# Patient Record
Sex: FEMALE | Race: WHITE | Employment: FULL TIME | URBAN - METROPOLITAN AREA
[De-identification: names, ages, dates, MRNs, and addresses within clinical notes are randomized per-mention and may not be internally consistent; named-entity substitution may affect disease eponyms.]

---

## 2018-03-09 ENCOUNTER — HOSPITAL ENCOUNTER (OUTPATIENT)
Age: 48
Setting detail: OBSERVATION
Discharge: HOME | End: 2018-03-10
Attending: INTERNAL MEDICINE | Admitting: INTERNAL MEDICINE
Payer: COMMERCIAL

## 2018-03-09 PROBLEM — I25.10 CAD (CORONARY ARTERY DISEASE): Status: ACTIVE | Noted: 2018-03-09

## 2018-03-09 PROBLEM — I10 HTN (HYPERTENSION): Status: ACTIVE | Noted: 2018-03-09

## 2018-03-09 PROBLEM — R07.9 CHEST PAIN: Status: ACTIVE | Noted: 2018-03-09

## 2018-03-09 PROBLEM — J44.9 COPD (CHRONIC OBSTRUCTIVE PULMONARY DISEASE) (HCC): Status: ACTIVE | Noted: 2018-03-09

## 2018-03-09 PROBLEM — I25.110 CORONARY ARTERY DISEASE INVOLVING NATIVE CORONARY ARTERY OF NATIVE HEART WITH UNSTABLE ANGINA PECTORIS (HCC): Status: ACTIVE | Noted: 2018-03-09

## 2018-03-09 LAB
HCG SERPL QL: NEGATIVE
TROPONIN I SERPL-MCNC: <0.02 NG/ML (ref 0.02–0.05)

## 2018-03-09 PROCEDURE — 93005 ELECTROCARDIOGRAM TRACING: CPT | Performed by: NURSE PRACTITIONER

## 2018-03-09 PROCEDURE — 74011250637 HC RX REV CODE- 250/637: Performed by: INTERNAL MEDICINE

## 2018-03-09 PROCEDURE — 99218 HC RM OBSERVATION: CPT

## 2018-03-09 PROCEDURE — 96375 TX/PRO/DX INJ NEW DRUG ADDON: CPT

## 2018-03-09 PROCEDURE — 96361 HYDRATE IV INFUSION ADD-ON: CPT

## 2018-03-09 PROCEDURE — 74011250636 HC RX REV CODE- 250/636: Performed by: NURSE PRACTITIONER

## 2018-03-09 PROCEDURE — 96376 TX/PRO/DX INJ SAME DRUG ADON: CPT

## 2018-03-09 PROCEDURE — 96374 THER/PROPH/DIAG INJ IV PUSH: CPT

## 2018-03-09 PROCEDURE — 84484 ASSAY OF TROPONIN QUANT: CPT | Performed by: INTERNAL MEDICINE

## 2018-03-09 PROCEDURE — 36415 COLL VENOUS BLD VENIPUNCTURE: CPT | Performed by: INTERNAL MEDICINE

## 2018-03-09 PROCEDURE — 74011636637 HC RX REV CODE- 636/637: Performed by: NURSE PRACTITIONER

## 2018-03-09 PROCEDURE — 74011250637 HC RX REV CODE- 250/637: Performed by: NURSE PRACTITIONER

## 2018-03-09 PROCEDURE — 84703 CHORIONIC GONADOTROPIN ASSAY: CPT | Performed by: INTERNAL MEDICINE

## 2018-03-09 RX ORDER — PREDNISONE 20 MG/1
60 TABLET ORAL EVERY 6 HOURS
Status: COMPLETED | OUTPATIENT
Start: 2018-03-09 | End: 2018-03-10

## 2018-03-09 RX ORDER — ONDANSETRON 2 MG/ML
4 INJECTION INTRAMUSCULAR; INTRAVENOUS
Status: DISCONTINUED | OUTPATIENT
Start: 2018-03-09 | End: 2018-03-10 | Stop reason: HOSPADM

## 2018-03-09 RX ORDER — GUAIFENESIN 100 MG/5ML
81 LIQUID (ML) ORAL DAILY
Status: DISCONTINUED | OUTPATIENT
Start: 2018-03-10 | End: 2018-03-10 | Stop reason: HOSPADM

## 2018-03-09 RX ORDER — DIPHENHYDRAMINE HCL 25 MG
25 CAPSULE ORAL EVERY 6 HOURS
Status: DISCONTINUED | OUTPATIENT
Start: 2018-03-09 | End: 2018-03-10 | Stop reason: HOSPADM

## 2018-03-09 RX ORDER — SODIUM CHLORIDE 0.9 % (FLUSH) 0.9 %
5-10 SYRINGE (ML) INJECTION EVERY 8 HOURS
Status: DISCONTINUED | OUTPATIENT
Start: 2018-03-09 | End: 2018-03-10 | Stop reason: HOSPADM

## 2018-03-09 RX ORDER — SODIUM CHLORIDE 9 MG/ML
100 INJECTION, SOLUTION INTRAVENOUS CONTINUOUS
Status: DISCONTINUED | OUTPATIENT
Start: 2018-03-10 | End: 2018-03-10 | Stop reason: HOSPADM

## 2018-03-09 RX ORDER — IBUPROFEN 200 MG
1 TABLET ORAL EVERY 24 HOURS
Status: DISCONTINUED | OUTPATIENT
Start: 2018-03-09 | End: 2018-03-10 | Stop reason: HOSPADM

## 2018-03-09 RX ORDER — IBUPROFEN 200 MG
1 TABLET ORAL DAILY
Status: DISCONTINUED | OUTPATIENT
Start: 2018-03-10 | End: 2018-03-09

## 2018-03-09 RX ORDER — MORPHINE SULFATE 2 MG/ML
2 INJECTION, SOLUTION INTRAMUSCULAR; INTRAVENOUS
Status: DISCONTINUED | OUTPATIENT
Start: 2018-03-09 | End: 2018-03-10 | Stop reason: HOSPADM

## 2018-03-09 RX ORDER — LORAZEPAM 0.5 MG/1
0.5 TABLET ORAL
Status: DISCONTINUED | OUTPATIENT
Start: 2018-03-09 | End: 2018-03-10 | Stop reason: HOSPADM

## 2018-03-09 RX ORDER — NITROGLYCERIN 0.4 MG/1
0.4 TABLET SUBLINGUAL
Status: DISCONTINUED | OUTPATIENT
Start: 2018-03-09 | End: 2018-03-10 | Stop reason: HOSPADM

## 2018-03-09 RX ORDER — SODIUM CHLORIDE 0.9 % (FLUSH) 0.9 %
5-10 SYRINGE (ML) INJECTION AS NEEDED
Status: DISCONTINUED | OUTPATIENT
Start: 2018-03-09 | End: 2018-03-10 | Stop reason: HOSPADM

## 2018-03-09 RX ORDER — FAMOTIDINE 20 MG/1
20 TABLET, FILM COATED ORAL 2 TIMES DAILY
Status: DISCONTINUED | OUTPATIENT
Start: 2018-03-09 | End: 2018-03-10 | Stop reason: HOSPADM

## 2018-03-09 RX ADMIN — Medication 5 ML: at 23:30

## 2018-03-09 RX ADMIN — PREDNISONE 60 MG: 20 TABLET ORAL at 23:23

## 2018-03-09 RX ADMIN — NITROGLYCERIN 0.4 MG: 0.4 TABLET SUBLINGUAL at 22:08

## 2018-03-09 RX ADMIN — DIPHENHYDRAMINE HYDROCHLORIDE 25 MG: 25 CAPSULE ORAL at 23:23

## 2018-03-09 RX ADMIN — NITROGLYCERIN 0.4 MG: 0.4 TABLET SUBLINGUAL at 19:48

## 2018-03-09 RX ADMIN — ONDANSETRON 4 MG: 2 INJECTION INTRAMUSCULAR; INTRAVENOUS at 19:58

## 2018-03-09 RX ADMIN — SODIUM CHLORIDE 500 ML: 900 INJECTION, SOLUTION INTRAVENOUS at 23:30

## 2018-03-09 RX ADMIN — NITROGLYCERIN 1 INCH: 20 OINTMENT TOPICAL at 18:37

## 2018-03-09 RX ADMIN — PREDNISONE 60 MG: 20 TABLET ORAL at 18:41

## 2018-03-09 RX ADMIN — SODIUM CHLORIDE 100 ML/HR: 900 INJECTION, SOLUTION INTRAVENOUS at 22:18

## 2018-03-09 RX ADMIN — NITROGLYCERIN 1 INCH: 20 OINTMENT TOPICAL at 23:23

## 2018-03-09 RX ADMIN — FAMOTIDINE 20 MG: 20 TABLET, FILM COATED ORAL at 18:41

## 2018-03-09 RX ADMIN — DIPHENHYDRAMINE HYDROCHLORIDE 25 MG: 25 CAPSULE ORAL at 18:41

## 2018-03-09 RX ADMIN — MORPHINE SULFATE 2 MG: 2 INJECTION, SOLUTION INTRAMUSCULAR; INTRAVENOUS at 18:44

## 2018-03-09 RX ADMIN — MORPHINE SULFATE 2 MG: 2 INJECTION, SOLUTION INTRAMUSCULAR; INTRAVENOUS at 23:00

## 2018-03-09 RX ADMIN — LORAZEPAM 0.5 MG: 0.5 TABLET ORAL at 19:57

## 2018-03-09 NOTE — IP AVS SNAPSHOT
[address] [place] [id number] 
[phone] Patient: Bernice Vasques MRN: [id number] :1970 A check florinda indicates which time of day the medication should be taken. My Medications START taking these medications Instructions Each Dose to Equal  
 Morning Noon Evening Bedtime  
 famotidine 20 mg tablet Commonly known as:  PEPCID Your last dose was:  3/10/2018 Take 1 Tab by mouth two (2) times a day. 20 mg Where to Get Your Medications Information on where to get these meds will be given to you by the nurse or doctor. ! Ask your nurse or doctor about these medications  
  famotidine 20 mg tablet · Reached out via phone to RICK Neville from [facility]. 

## 2018-03-09 NOTE — H&P
3/9/2018 5:40 PM    Admit Date: 3/9/2018    Admit Diagnosis: Chest Pain      Subjective:    Patient with recent treatment for bronchitis. Presented to outlying facility with 12 hours of chest pain arm pain and neck pain. Onset last night no precipitating or alleviating conditions. No prior cad history. 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 Worked up and thought to be new onset unstable angina. Transferred for cath    Objective: There were no vitals taken for this visit. ROS:  General ROS: negative for - chills  Hematological and Lymphatic ROS: negative for - blood clots or jaundice  Respiratory ROS: positive for - cough and shortness of breath  Cardiovascular ROS: positive for - chest pain and dyspnea on exertion  Gastrointestinal ROS: no abdominal pain, change in bowel habits, or black or bloody stools  Neurological ROS: no TIA or stroke symptoms    Physical Exam:    Physical Examination: General appearance - alert, well appearing, and in no distress  Mental status - alert, oriented to person, place, and time  Eyes - pupils equal and reactive, extraocular eye movements intact  Neck/lymph - supple, no significant adenopathy  Chest/CV - clear to auscultation, no wheezes, rales or rhonchi, symmetric air entry  Heart - normal rate, regular rhythm, normal S1, S2, no murmurs, rubs, clicks or gallops  Abdomen/GI - soft, nontender, nondistended, no masses or organomegaly  Musculoskeletal - no joint tenderness, deformity or swelling  Extremities - peripheral pulses normal, no pedal edema, no clubbing or cyanosis  Skin - normal coloration and turgor, no rashes, no suspicious skin lesions noted    No current facility-administered medications for this encounter.         Data Review:   @LABRCNT(Na,K,BUN,CREA,WBC,HGB,HCT,PLT,INR,TRP,TCHOL*,Triglyceride*,LDL*,LDLCPOC HDL*,HDL])@    TELEMETRY: nsr    Assessment/Plan:     Active Problems:    HTN (hypertension) (3/9/2018) The current medical regimen is effective;  continue present plan and medications. Monitor bp      Coronary artery disease involving native coronary artery of native heart with unstable angina pectoris ([facility] (3/9/2018)presumed cad. With new onset cp aT REST NEEDS CATH IN AM. Risk and benefits discussed. Risk of bleeding, infection, vascular injury, kidney failure or CVA reviewed. Risk of emergent surgery or death discussed. COPD (chronic obstructive pulmonary disease) ([facility] (3/9/2018) bronchitis. Seems stable needs to stop smoking.           Joe Stuart MD

## 2018-03-09 NOTE — PROGRESS NOTES
Verbal bedside report given to Herlinda oncoming RN. Patient's situation, background, assessment and recommendations provided. Opportunity for questions provided. Oncoming RN assumed care of patient.

## 2018-03-09 NOTE — IP AVS SNAPSHOT
[facility] 
 
 
 [address] [facility] [address] 
[phone] Patient: Merrick Gerardo MRN: [id number] :1970 About your hospitalization You were admitted on:  2018 You last received care in the:  [facility] 3 CLINICAL OBSERVATION You were discharged on:  March 10, 2018 Why you were hospitalized Your primary diagnosis was:  Non-Cardiac Chest Pain Your diagnoses also included:  Htn (Hypertension), Copd (Chronic Obstructive Pulmonary Disease) (Hcc), Tobacco Abuse Follow-up Information Follow up With Details Comments Contact Info Joselyn Krishnamurthy MD In 2 weeks [facility] follow up, Please call office for appointment on  [place] [address] [address] [facility] [address] 
[phone] Your Scheduled Appointments 2018  8:20 AM EDT  
(Arrive by 7:50 AM) New Patient with [facility]) [address] [facility] 40  
[phone] Discharge Orders None A check florinda indicates which time of day the medication should be taken. My Medications START taking these medications Instructions Each Dose to Equal  
 Morning Noon Evening Bedtime  
 famotidine 20 mg tablet Commonly known as:  PEPCID Your last dose was:  3/10/2018 Take 1 Tab by mouth two (2) times a day. 20 mg Where to Get Your Medications Information on where to get these meds will be given to you by the nurse or doctor. ! Ask your nurse or doctor about these medications  
  famotidine 20 mg tablet Discharge Instructions DISCHARGE SUMMARY from Nurse PATIENT INSTRUCTIONS: 
 
 
F-face looks uneven A-arms unable to move or move unevenly S-speech slurred or non-existent T-time-call 911 as soon as signs and symptoms begin-DO NOT go Back to bed or wait to see if you get better-TIME IS BRAIN. Warning Signs of HEART ATTACK Call 911 if you have these symptoms: 
? Chest discomfort. Most heart attacks involve discomfort in the center of the chest that lasts more than a few minutes, or that goes away and comes back. It can feel like uncomfortable pressure, squeezing, fullness, or pain. ? Discomfort in other areas of the upper body. Symptoms can include pain or discomfort in one or both arms, the back, neck, jaw, or stomach. ? Shortness of breath with or without chest discomfort. ? Other signs may include breaking out in a cold sweat, nausea, or lightheadedness. Don't wait more than five minutes to call [address]! Fast action can save your life. Calling 911 is almost always the fastest way to get lifesaving treatment. Emergency Medical Services staff can begin treatment when they arrive  up to an hour sooner than if someone gets to the hospital by car. The discharge information has been reviewed with the patient. The patient verbalized understanding. Discharge medications reviewed with the patient and appropriate educational materials and side effects teaching were provided. ___________________________________________________________________________________________________________________________________ Cardiac Catheterization/Angiography Discharge Instructions *Check the puncture site frequently for swelling or bleeding. If you see any bleeding, lie down and apply pressure over the area with a clean town or washcloth.  Notify your doctor for any redness, swelling, drainage or oozing from the puncture site. Notify your doctor for any fever or chills. *If the leg or arm with the puncture becomes cold, numb or painful, call Dr Rudy Mandel at  [phone] *Activity should be limited for the next 48 hours. Climb stairs as little as possible and avoid any stooping, bending or strenuous activity for 48 hours. No heavy lifting (anything over 10 pounds) for three days. *Do not drive for 48 hours. *You may resume your usual diet. Drink more fluids than usual. 
 
*Have a responsible person drive you home and stay with you for at least 24 hours after your heart catheterization/angiography. *You may remove the bandage from your Right and Arm in 24 hours. You may shower in 24 hours. No tub baths, hot tubs or swimming for one week. Do not place any lotions, creams, powders, ointments over the puncture site for one week. You may place a clean band-aid over the puncture site each day for 5 days. Change this daily. [employer] Announcement We are excited to announce that we are making your provider's discharge notes available to you in [employer]. You will see these notes when they are completed and signed by the physician that discharged you from your recent hospital stay. If you have any questions or concerns about any information you see in [employer], please call the Health Information Department where you were seen or reach out to your Primary Care Provider for more information about your plan of care. Introducing [facility]! [facility] introduces [employer] patient portal. Now you can access parts of your medical record, email your doctor's office, and request medication refills online. 1. In your internet browser, go to [email or web address] 2. Click on the First Time User? Click Here link in the Sign In box. You will see the New Member Sign Up page. 3. Enter your [employer] Access Code exactly as it appears below.  You will not need to use this code after youve completed the sign-up process. If you do not sign up before the expiration date, you must request a new code. · [employer] Access Code: [email or web address] Expires: 6/7/2018  5:33 PM 
 
4. Enter the last four digits of your Social Security Number (xxxx) and Date of Birth (mm/dd/yyyy) as indicated and click Submit. You will be taken to the next sign-up page. 5. Create a [employer] ID. This will be your [employer] login ID and cannot be changed, so think of one that is secure and easy to remember. 6. Create a [employer] password. You can change your password at any time. 7. Enter your Password Reset Question and Answer. This can be used at a later time if you forget your password. 8. Enter your e-mail address. You will receive e-mail notification when new information is available in [address] 9. Click Sign Up. You can now view and download portions of your medical record. 10. Click the Download Summary menu link to download a portable copy of your medical information. If you have questions, please visit the Frequently Asked Questions section of the [employer] website. Remember, [employer] is NOT to be used for urgent needs. For medical emergencies, dial 911. Now available from your iPhone and Android! Providers Seen During Your Hospitalization Provider Specialty Primary office phone Pari Duncan MD Cardiology [phone] Your Primary Care Physician (PCP) Primary Care Physician Office Phone Office Fax NOT ON FILE ** None ** ** None ** You are allergic to the following Allergen Reactions Iodinated Contrast- Oral And Iv Dye Hives Keflex (Cephalexin) Hives Recent Documentation Height Weight BMI  
  
  
 1.676 m 73.8 kg 26.28 kg/m2 Emergency Contacts Name Discharge Info Relation Home Work Mobile Unknown,Unknown  Other Relative [6] [phone] Patient Belongings The following personal items are in your possession at time of discharge: 
  Dental Appliances: None  Visual Aid: Glasses, With patient      Home Medications: None   Jewelry: None  Clothing: At bedside    Other Valuables: Eyeglasses, Cell Phone Please provide this summary of care documentation to your next provider. Signatures-by signing, you are acknowledging that this After Visit Summary has been reviewed with you and you have received a copy. Patient Signature:  ____________________________________________________________ Date:  ____________________________________________________________  
  
Buffy Deiters Provider Signature:  ____________________________________________________________ Date:  ____________________________________________________________

## 2018-03-09 NOTE — PROGRESS NOTES
Patient received to room 333 as direct admit. Patient oriented to room, call light and plan of care. Admission assessment completed. Admission skin assessment completed with second RN and reveals the following: Patient's sacrum and heels are intact and no skin breakdown. Patient's general skin is red but intact. Patient states she is sunburned. Scattered abrasions and scarred tissue on patient's face cheeks.

## 2018-03-10 VITALS
RESPIRATION RATE: 18 BRPM | HEART RATE: 52 BPM | BODY MASS INDEX: 26.16 KG/M2 | OXYGEN SATURATION: 95 % | SYSTOLIC BLOOD PRESSURE: 107 MMHG | TEMPERATURE: 97.9 F | HEIGHT: 66 IN | DIASTOLIC BLOOD PRESSURE: 58 MMHG | WEIGHT: 162.8 LBS

## 2018-03-10 PROBLEM — R07.89 NON-CARDIAC CHEST PAIN: Status: ACTIVE | Noted: 2018-03-10

## 2018-03-10 PROBLEM — Z72.0 TOBACCO ABUSE: Status: ACTIVE | Noted: 2018-03-10

## 2018-03-10 LAB
ANION GAP SERPL CALC-SCNC: 8 MMOL/L (ref 7–16)
ATRIAL RATE: 62 BPM
BUN SERPL-MCNC: 16 MG/DL (ref 6–23)
CALCIUM SERPL-MCNC: 8.6 MG/DL (ref 8.3–10.4)
CALCULATED P AXIS, ECG09: 61 DEGREES
CALCULATED R AXIS, ECG10: 29 DEGREES
CALCULATED T AXIS, ECG11: 65 DEGREES
CHLORIDE SERPL-SCNC: 110 MMOL/L (ref 98–107)
CHOLEST SERPL-MCNC: 143 MG/DL
CO2 SERPL-SCNC: 24 MMOL/L (ref 21–32)
CREAT SERPL-MCNC: 0.78 MG/DL (ref 0.6–1)
DIAGNOSIS, 93000: NORMAL
GLUCOSE SERPL-MCNC: 157 MG/DL (ref 65–100)
HDLC SERPL-MCNC: 56 MG/DL (ref 40–60)
HDLC SERPL: 2.6 {RATIO}
LDLC SERPL CALC-MCNC: 76.6 MG/DL
LIPID PROFILE,FLP: NORMAL
P-R INTERVAL, ECG05: 160 MS
POTASSIUM SERPL-SCNC: 4.3 MMOL/L (ref 3.5–5.1)
Q-T INTERVAL, ECG07: 440 MS
QRS DURATION, ECG06: 90 MS
QTC CALCULATION (BEZET), ECG08: 446 MS
SODIUM SERPL-SCNC: 142 MMOL/L (ref 136–145)
TRIGL SERPL-MCNC: 52 MG/DL (ref 35–150)
TROPONIN I SERPL-MCNC: <0.02 NG/ML (ref 0.02–0.05)
VENTRICULAR RATE, ECG03: 62 BPM
VLDLC SERPL CALC-MCNC: 10.4 MG/DL (ref 6–23)

## 2018-03-10 PROCEDURE — 74011250636 HC RX REV CODE- 250/636

## 2018-03-10 PROCEDURE — 93458 L HRT ARTERY/VENTRICLE ANGIO: CPT

## 2018-03-10 PROCEDURE — 84484 ASSAY OF TROPONIN QUANT: CPT | Performed by: INTERNAL MEDICINE

## 2018-03-10 PROCEDURE — 74011636320 HC RX REV CODE- 636/320: Performed by: INTERNAL MEDICINE

## 2018-03-10 PROCEDURE — 74011000250 HC RX REV CODE- 250: Performed by: INTERNAL MEDICINE

## 2018-03-10 PROCEDURE — 96376 TX/PRO/DX INJ SAME DRUG ADON: CPT

## 2018-03-10 PROCEDURE — C1769 GUIDE WIRE: HCPCS

## 2018-03-10 PROCEDURE — 80061 LIPID PANEL: CPT | Performed by: INTERNAL MEDICINE

## 2018-03-10 PROCEDURE — 74011250636 HC RX REV CODE- 250/636: Performed by: INTERNAL MEDICINE

## 2018-03-10 PROCEDURE — 74011250637 HC RX REV CODE- 250/637: Performed by: NURSE PRACTITIONER

## 2018-03-10 PROCEDURE — 96361 HYDRATE IV INFUSION ADD-ON: CPT

## 2018-03-10 PROCEDURE — 99218 HC RM OBSERVATION: CPT

## 2018-03-10 PROCEDURE — 74011250637 HC RX REV CODE- 250/637: Performed by: PHYSICIAN ASSISTANT

## 2018-03-10 PROCEDURE — 77030019569 HC BND COMPR RAD TERU -B

## 2018-03-10 PROCEDURE — C1894 INTRO/SHEATH, NON-LASER: HCPCS

## 2018-03-10 PROCEDURE — 77030015766

## 2018-03-10 PROCEDURE — 74011636637 HC RX REV CODE- 636/637: Performed by: NURSE PRACTITIONER

## 2018-03-10 PROCEDURE — 74011250636 HC RX REV CODE- 250/636: Performed by: NURSE PRACTITIONER

## 2018-03-10 PROCEDURE — 80048 BASIC METABOLIC PNL TOTAL CA: CPT | Performed by: INTERNAL MEDICINE

## 2018-03-10 PROCEDURE — 36415 COLL VENOUS BLD VENIPUNCTURE: CPT | Performed by: INTERNAL MEDICINE

## 2018-03-10 PROCEDURE — 99152 MOD SED SAME PHYS/QHP 5/>YRS: CPT

## 2018-03-10 RX ORDER — FENTANYL CITRATE 50 UG/ML
25-75 INJECTION, SOLUTION INTRAMUSCULAR; INTRAVENOUS
Status: DISCONTINUED | OUTPATIENT
Start: 2018-03-10 | End: 2018-03-10 | Stop reason: HOSPADM

## 2018-03-10 RX ORDER — LIDOCAINE HYDROCHLORIDE 20 MG/ML
2-10 INJECTION, SOLUTION INFILTRATION; PERINEURAL
Status: DISCONTINUED | OUTPATIENT
Start: 2018-03-10 | End: 2018-03-10 | Stop reason: HOSPADM

## 2018-03-10 RX ORDER — HEPARIN SODIUM 200 [USP'U]/100ML
3 INJECTION, SOLUTION INTRAVENOUS CONTINUOUS
Status: DISCONTINUED | OUTPATIENT
Start: 2018-03-10 | End: 2018-03-10 | Stop reason: HOSPADM

## 2018-03-10 RX ORDER — MIDAZOLAM HYDROCHLORIDE 1 MG/ML
.5-2 INJECTION, SOLUTION INTRAMUSCULAR; INTRAVENOUS
Status: DISCONTINUED | OUTPATIENT
Start: 2018-03-10 | End: 2018-03-10 | Stop reason: HOSPADM

## 2018-03-10 RX ORDER — FAMOTIDINE 20 MG/1
20 TABLET, FILM COATED ORAL 2 TIMES DAILY
Qty: 60 TAB | Refills: 1 | Status: SHIPPED | OUTPATIENT
Start: 2018-03-10

## 2018-03-10 RX ADMIN — IOPAMIDOL 70 ML: 755 INJECTION, SOLUTION INTRAVENOUS at 08:46

## 2018-03-10 RX ADMIN — DIPHENHYDRAMINE HYDROCHLORIDE 25 MG: 25 CAPSULE ORAL at 06:24

## 2018-03-10 RX ADMIN — Medication 30 ML: at 12:01

## 2018-03-10 RX ADMIN — PREDNISONE 60 MG: 20 TABLET ORAL at 06:24

## 2018-03-10 RX ADMIN — FAMOTIDINE 20 MG: 20 TABLET, FILM COATED ORAL at 07:50

## 2018-03-10 RX ADMIN — Medication 5 ML: at 06:25

## 2018-03-10 RX ADMIN — FENTANYL CITRATE 50 MCG: 50 INJECTION, SOLUTION INTRAMUSCULAR; INTRAVENOUS at 08:39

## 2018-03-10 RX ADMIN — MIDAZOLAM HYDROCHLORIDE 2 MG: 1 INJECTION, SOLUTION INTRAMUSCULAR; INTRAVENOUS at 08:39

## 2018-03-10 RX ADMIN — LIDOCAINE HYDROCHLORIDE 40 MG: 20 INJECTION, SOLUTION INFILTRATION; PERINEURAL at 08:39

## 2018-03-10 RX ADMIN — HEPARIN SODIUM 2 ML: 10000 INJECTION, SOLUTION INTRAVENOUS; SUBCUTANEOUS at 08:41

## 2018-03-10 RX ADMIN — NITROGLYCERIN 1 INCH: 20 OINTMENT TOPICAL at 06:24

## 2018-03-10 RX ADMIN — SODIUM CHLORIDE 100 ML/HR: 900 INJECTION, SOLUTION INTRAVENOUS at 03:57

## 2018-03-10 RX ADMIN — HEPARIN SODIUM 3 UNITS/HR: 200 INJECTION, SOLUTION INTRAVENOUS at 08:32

## 2018-03-10 RX ADMIN — ASPIRIN 81 MG 81 MG: 81 TABLET ORAL at 07:50

## 2018-03-10 RX ADMIN — MORPHINE SULFATE 2 MG: 2 INJECTION, SOLUTION INTRAMUSCULAR; INTRAVENOUS at 03:53

## 2018-03-10 RX ADMIN — DIPHENHYDRAMINE HYDROCHLORIDE 25 MG: 25 CAPSULE ORAL at 12:01

## 2018-03-10 NOTE — ROUTINE PROCESS
Cath Lab Transfer In    Transferred from tele  Transferred to : Cath Lab Procedure Room 1  Reason for Transfer: [address] [facility]    [facility]      Patient Assessment    Patient received into procedure room. No acute distress noted or verbalized. Procedural prep completed. Iv accesses assessed for patency. Review of pertinent labs and allergies completed. Noted presence of current History & Physical, updated within the previous 24 hours. Consent signed.

## 2018-03-10 NOTE — DISCHARGE INSTRUCTIONS
DISCHARGE SUMMARY from Nurse    PATIENT INSTRUCTIONS:    After general anesthesia or intravenous sedation, for 24 hours or while taking prescription Narcotics:  · Limit your activities  · Do not drive and operate hazardous machinery  · Do not make important personal or business decisions  · Do  not drink alcoholic beverages  · If you have not urinated within 8 hours after discharge, please contact your surgeon on call. Report the following to your surgeon:  · Excessive pain, swelling, redness or odor of or around the surgical area  · Temperature over 100.5  · Nausea and vomiting lasting longer than 4 hours or if unable to take medications  · Any signs of decreased circulation or nerve impairment to extremity: change in color, persistent  numbness, tingling, coldness or increase pain  · Any questions    What to do at Home:  Recommended activity: No lifting, Driving, or Strenuous exercise for 3 days    If you experience any of the following symptoms chest pain, shortness of breath, drainage at puncture site  please follow up with [facility]. *  Please give a list of your current medications to your Primary Care Provider. *  Please update this list whenever your medications are discontinued, doses are      changed, or new medications (including over-the-counter products) are added. *  Please carry medication information at all times in case of emergency situations. These are general instructions for a healthy lifestyle:    No smoking/ No tobacco products/ Avoid exposure to second hand smoke  Surgeon General's Warning:  Quitting smoking now greatly reduces serious risk to your health.     Obesity, smoking, and sedentary lifestyle greatly increases your risk for illness    A healthy diet, regular physical exercise & weight monitoring are important for maintaining a healthy lifestyle    You may be retaining fluid if you have a history of heart failure or if you experience any of the following symptoms:  Weight gain of 3 pounds or more overnight or 5 pounds in a week, increased swelling in our hands or feet or shortness of breath while lying flat in bed. Please call your doctor as soon as you notice any of these symptoms; do not wait until your next office visit. Recognize signs and symptoms of STROKE:    F-face looks uneven    A-arms unable to move or move unevenly    S-speech slurred or non-existent    T-time-call 911 as soon as signs and symptoms begin-DO NOT go       Back to bed or wait to see if you get better-TIME IS BRAIN. Warning Signs of HEART ATTACK     Call 911 if you have these symptoms:   Chest discomfort. Most heart attacks involve discomfort in the center of the chest that lasts more than a few minutes, or that goes away and comes back. It can feel like uncomfortable pressure, squeezing, fullness, or pain.  Discomfort in other areas of the upper body. Symptoms can include pain or discomfort in one or both arms, the back, neck, jaw, or stomach.  Shortness of breath with or without chest discomfort.  Other signs may include breaking out in a cold sweat, nausea, or lightheadedness. Don't wait more than five minutes to call 911 - MINUTES MATTER! Fast action can save your life. Calling 911 is almost always the fastest way to get lifesaving treatment. Emergency Medical Services staff can begin treatment when they arrive -- up to an hour sooner than if someone gets to the hospital by car. The discharge information has been reviewed with the patient. The patient verbalized understanding. Discharge medications reviewed with the patient and appropriate educational materials and side effects teaching were provided. ___________________________________________________________________________________________________________________________________    Cardiac Catheterization/Angiography Discharge Instructions    *Check the puncture site frequently for swelling or bleeding.  If you see any bleeding, lie down and apply pressure over the area with a clean town or washcloth. Notify your doctor for any redness, swelling, drainage or oozing from the puncture site. Notify your doctor for any fever or chills. *If the leg or arm with the puncture becomes cold, numb or painful, call Dr Jeffry Meléndez at  [phone]    *Activity should be limited for the next 48 hours. Climb stairs as little as possible and avoid any stooping, bending or strenuous activity for 48 hours. No heavy lifting (anything over 10 pounds) for three days. *Do not drive for 48 hours. *You may resume your usual diet. Drink more fluids than usual.    *Have a responsible person drive you home and stay with you for at least 24 hours after your heart catheterization/angiography. *You may remove the bandage from your Right and Arm in 24 hours. You may shower in 24 hours. No tub baths, hot tubs or swimming for one week. Do not place any lotions, creams, powders, ointments over the puncture site for one week. You may place a clean band-aid over the puncture site each day for 5 days. Change this daily.

## 2018-03-10 NOTE — PROGRESS NOTES
Applied TR-band to right wrist with 13 mL of air in band. Site without bleeding or hematoma. Capillary refill distal to the site was less than 2 seconds. Patient instructed to limit movement of affected wrist. Patient verbalized understanding.

## 2018-03-10 NOTE — PROGRESS NOTES
Reviewed discharged instructions with patient and new prescription for Famotidine. Patient verbalized understanding and had no further questions. Patient was also given work note to be excused until 3/13/2018. Patient's boyfriend states he is transporting the patient home.

## 2018-03-10 NOTE — PROGRESS NOTES
Bedside and Verbal shift change report received from [facility], [facility], [facility].  Report included the following information SBAR, Kardex, Intake/Output, MAR, Recent Results and Cardiac Rhythm SR.

## 2018-03-10 NOTE — PROGRESS NOTES
[address] March 10, 2018       RE: Mayank Beck      To Whom It May Concern,    This is to certify that Mayank Beck may may return to work on 3/13/18. Please feel free to contact my office if you have any questions or concerns. Thank you for your assistance in this matter.       Sincerely,  CV/Tele unit at [facility]

## 2018-03-10 NOTE — PROCEDURES
[address] Shiraz Graham  MR#: [id number]  : 1970  ACCOUNT #: [de-identified]   DATE OF SERVICE: 03/10/2018    PROCEDURE PERFORMED:  Left heart cath, selective coronary angiography, left ventriculogram.    INDICATION:  New onset class 4 angina. COMPLICATIONS:  None. ACCESS:  Right radial.      TIME:  8:30-8:53     MEDICATIONS:  2 mg Versed, 50 mcg of fentanyl delivered by Dede Dakin, RN. Aortic pressure 113/71, LVEDP 13. CONTRAST:  70 mL     FINDINGS:  Left ventriculogram done in the PAYAN projection shows EF 65%. No gradient on pullback. No wall motion abnormalities. Left main arises normally, bifurcates in LAD and circumflex. Left main is large and normal.    LAD courses the apex, supplies 3 diagonals. The LAD and diagonals are large and normal.    The circumflex artery in the AV groove is nondominant, supplies 2 OMs. The circumflex and OMs are normal.    Right coronary artery is a large, dominant artery coursing in the AV groove supplying a posterior descending and posterior lateral, right system is normal.    CONCLUSIONS:  Normal coronary arteriography with normal left ventricular systolic function. The patient has noncardiac chest pain.       MD [place] Flaherty /   D: 03/10/2018 08:54     T: 03/10/2018 11:01  JOB #: [id number]

## 2018-03-10 NOTE — PROGRESS NOTES
Patient reporting sustained 8/10 chest pain at 2315. BP 93/51. Patricia Velásquez NP notified. 500 mL normal saline bolus ordered. Bolus started at 999 mL/hr over 30 minutes. Patient given morphine. BP set to be checked every 30 minutes.

## 2018-03-10 NOTE — DISCHARGE SUMMARY
[place] [facility] Discharge Summary     Patient ID:  Selena Tomas  [id number]  78 y.o.  1970    Admit date: 3/9/2018    Discharge date:  3/10/2018    Admitting Physician: Fritz Smith MD     Discharge Physician: SLIM Rogel/Dr. Sachin Magana    Admission Diagnoses: Chest Pain  CAD (coronary artery disease)  Chest pain    Discharge Diagnoses:    Diagnosis    Tobacco abuse    Non-cardiac chest pain    HTN (hypertension)    COPD (chronic obstructive pulmonary disease) [facility])       Cardiology Procedures this admission:  Diagnostic left heart catheterization  Consults: None    Hospital Course: Patient presented to the emergency department of [facility] with complaints of chest pain x 12 hours with no precipitating factors and h/o tobacco use. Patient was evaluated and subsequently admitted for further cardiac evaluation and treatment. Cardiac enzymes were negative. LHC was planned for 3-10-18. Patient underwent cardiac catheterization by Dr. Sachin Magana which showed normal coronaries. Patient tolerated the procedure well and returned to the telemetry floor for recovery. The evening of 3/10/2018 patient was up feeling well without any complaints of chest pain or shortness of breath. Patient's right radial cath site was clean, dry and intact without hematoma or bruit. Patient's labs were WNL. Patient was seen and examined by Dr. Sachin Magana and determined stable and ready for discharge. The patient will follow up with [place] [facility] [facility] Dr. Sachin Magana as needed. DISPOSITION: The patient is being discharged home in stable condition on a low saturated fat, low cholesterol and low salt diet. The patient is instructed to advance activities as tolerated to the limit of fatigue or shortness of breath. The patient is instructed to avoid all heavy lifting for 5 days.  The patient is instructed to watch the cath site for bleeding/oozing; if seen, the patient is instructed to apply firm pressure with a clean cloth and call [address] Cardiology at [phone]. The patient is instructed to watch for signs of infection which include: increasing area of redness, fever/hot to touch or purulent drainage at the catheterization site. The patient is instructed not to soak in a bathtub for 7-10 days, but is cleared to shower. The patient is instructed to call the office or return to the ER for immediate evaluation for any shortness of breath or chest pain not relieved by NTG. Discharge Exam:   Visit Vitals    /60    Pulse 61    Temp 97.6 °F (36.4 °C)    Resp 16    Ht 5' 6\" (1.676 m)    Wt 73.8 kg (162 lb 12.8 oz)    SpO2 94%    BMI 26.28 kg/m2     Patient has been seen by Dr. Serrano Reason: see his progress note for exam details.     Recent Results (from the past 24 hour(s))   HCG QL SERUM    Collection Time: 03/09/18  6:52 PM   Result Value Ref Range    HCG, Ql. NEGATIVE  NEG     TROPONIN I    Collection Time: 03/09/18  6:52 PM   Result Value Ref Range    Troponin-I, Qt. <0.02 (L) 0.02 - 0.05 NG/ML   EKG, 12 LEAD, SUBSEQUENT    Collection Time: 03/09/18  7:56 PM   Result Value Ref Range    Ventricular Rate 62 BPM    Atrial Rate 62 BPM    P-R Interval 160 ms    QRS Duration 90 ms    Q-T Interval 440 ms    QTC Calculation (Bezet) 446 ms    Calculated P Axis 61 degrees    Calculated R Axis 29 degrees    Calculated T Axis 65 degrees    Diagnosis       Normal sinus rhythm  Normal ECG  No previous ECGs available     METABOLIC PANEL, BASIC    Collection Time: 03/10/18  1:53 AM   Result Value Ref Range    Sodium 142 136 - 145 mmol/L    Potassium 4.3 3.5 - 5.1 mmol/L    Chloride 110 (H) 98 - 107 mmol/L    CO2 24 21 - 32 mmol/L    Anion gap 8 7 - 16 mmol/L    Glucose 157 (H) 65 - 100 mg/dL    BUN 16 6 - 23 MG/DL    Creatinine 0.78 0.6 - 1.0 MG/DL    GFR est AA >60 >60 ml/min/1.73m2    GFR est non-AA >60 >60 ml/min/1.73m2    Calcium 8.6 8.3 - 10.4 MG/DL   LIPID PANEL    Collection Time: 03/10/18  1:53 AM   Result Value Ref Range    LIPID PROFILE          Cholesterol, total 143 <200 MG/DL    Triglyceride 52 35 - 150 MG/DL    HDL Cholesterol 56 40 - 60 MG/DL    LDL, calculated 76.6 <100 MG/DL    VLDL, calculated 10.4 6.0 - 23.0 MG/DL    CHOL/HDL Ratio 2.6     TROPONIN I    Collection Time: 03/10/18  1:53 AM   Result Value Ref Range    Troponin-I, Qt. <0.02 (L) 0.02 - 0.05 NG/ML         Patient Instructions:   Current Discharge Medication List      START taking these medications    Details   famotidine (PEPCID) 20 mg tablet Take 1 Tab by mouth two (2) times a day.   Qty: 60 Tab, Refills: 1               Signed:  Dionna Torres PA-C  3/10/2018  8:55 AM

## 2018-03-10 NOTE — PROGRESS NOTES
Bedside and Verbal shift change report given to self (oncoming nurse) by bakari shaikh RN (offgoing nurse). Report included the following information SBAR, Kardex and Recent Results.

## 2018-03-10 NOTE — ROUTINE PROCESS
TRANSFER - OUT REPORT:    [facility]  Dr. Hendrickson Meals  RRA access    Versed 2mg, fentanyl 50mcg  Diagnostic cath, medical management  TR band placed @ [phone] with 14ml air    Verbal report given to Prabhjot Ricketts RN(name) on Patrick Alicia  being transferred to [facility](unit) for routine progression of care       Report consisted of patients Situation, Background, Assessment and   Recommendations(SBAR). Information from the following report(s) Procedure Summary was reviewed with the receiving nurse. Lines:   Peripheral IV 03/09/18 Right Antecubital (Active)   Site Assessment Clean, dry, & intact 3/10/2018  4:10 AM   Phlebitis Assessment 0 3/10/2018  4:10 AM   Infiltration Assessment 0 3/10/2018  4:10 AM   Dressing Status Clean, dry, & intact 3/10/2018  4:10 AM   Dressing Type Transparent;Tape 3/10/2018  4:10 AM   Hub Color/Line Status Capped 3/10/2018  4:10 AM   Alcohol Cap Used No 3/9/2018  5:45 PM       Peripheral IV 03/09/18 Left Arm (Active)   Site Assessment Clean, dry, & intact 3/10/2018  4:10 AM   Phlebitis Assessment 0 3/10/2018  4:10 AM   Infiltration Assessment 0 3/10/2018  4:10 AM   Dressing Status Clean, dry, & intact 3/10/2018  4:10 AM   Dressing Type Transparent;Tape 3/10/2018  4:10 AM   Hub Color/Line Status Infusing 3/10/2018  4:10 AM        Opportunity for questions and clarification was provided.       Patient transported with:   [employer]

## 2018-03-10 NOTE — PROGRESS NOTES
Verbal bedside report given to Billy oncoming RN. Patient's situation, background, assessment and recommendations provided. Opportunity for questions provided. Oncoming RN assumed care of patient.

## 2018-03-10 NOTE — PROGRESS NOTES
3/10/2018 7:17 AM    Admit Date: 3/9/2018    Admit Diagnosis: Chest Pain;CAD (coronary artery disease); Chest pain      Subjective:    Patient with continued cp. Trop neg.  For cath today    Objective:      Visit Vitals    BP 99/50 (BP 1 Location: Right arm, BP Patient Position: At rest)    Pulse 65    Temp 97.6 °F (36.4 °C)    Resp 20    Ht 5' 6\" (1.676 m)    Wt 73.8 kg (162 lb 12.8 oz)    SpO2 96%    BMI 26.28 kg/m2       ROS:  General ROS: negative for - chills  Hematological and Lymphatic ROS: negative for - blood clots or jaundice  Respiratory ROS: no cough, shortness of breath, or wheezing  Cardiovascular ROS: no chest pain or dyspnea on exertion  Gastrointestinal ROS: no abdominal pain, change in bowel habits, or black or bloody stools  Neurological ROS: no TIA or stroke symptoms    Physical Exam:    Physical Examination: General appearance - alert, well appearing, and in no distress  Mental status - alert, oriented to person, place, and time  Eyes - pupils equal and reactive, extraocular eye movements intact  Neck/lymph - supple, no significant adenopathy  Chest/CV - clear to auscultation, no wheezes, rales or rhonchi, symmetric air entry  Heart - normal rate, regular rhythm, normal S1, S2, no murmurs, rubs, clicks or gallops  Abdomen/GI - soft, nontender, nondistended, no masses or organomegaly  Musculoskeletal - no joint tenderness, deformity or swelling  Extremities - peripheral pulses normal, no pedal edema, no clubbing or cyanosis  Skin - normal coloration and turgor, no rashes, no suspicious skin lesions noted    Current Facility-Administered Medications   Medication Dose Route Frequency    LORazepam (ATIVAN) tablet 0.5 mg  0.5 mg Oral Q4H PRN    sodium chloride (NS) flush 5-10 mL  5-10 mL IntraVENous Q8H    sodium chloride (NS) flush 5-10 mL  5-10 mL IntraVENous PRN    aspirin chewable tablet 81 mg  81 mg Oral DAILY    nitroglycerin (NITROBID) 2 % ointment 1 Inch  1 Inch Topical Q6H    nitroglycerin (NITROSTAT) tablet 0.4 mg  0.4 mg SubLINGual Q5MIN PRN    morphine injection 2 mg  2 mg IntraVENous Q4H PRN    0.9% sodium chloride infusion  100 mL/hr IntraVENous CONTINUOUS    famotidine (PEPCID) tablet 20 mg  20 mg Oral BID    diphenhydrAMINE (BENADRYL) capsule 25 mg  25 mg Oral Q6H    nicotine (NICODERM CQ) 21 mg/24 hr patch 1 Patch  1 Patch TransDERmal Q24H    ondansetron (ZOFRAN) injection 4 mg  4 mg IntraVENous Q6H PRN       Data Review:   @LABRCNT(Na,K,BUN,CREA,WBC,HGB,HCT,PLT,INR,TRP,TCHOL*,Triglyceride*,LDL*,LDLCPOC HDL*,HDL])@    TELEMETRY: nsr    Assessment/Plan:     Active Problems:    HTN (hypertension) (3/9/2018)The current medical regimen is effective;  continue present plan and medications.         Coronary artery disease involving native coronary artery of native heart with unstable angina pectoris ([facility] [address] (3/9/2018) cath today      COPD (chronic obstructive pulmonary disease) ([facility] [address] (3/9/2018)      CAD (coronary artery disease) (3/9/2018)      Chest pain (3/9/2018)          Smith Anders MD

## 2018-03-10 NOTE — PROGRESS NOTES
TRANSFER - IN REPORT:    Verbal report received from Jennifer Monge RN on Jeral Poag  being received from [address] [place] [address] for routine progression of care. Report consisted of patients Situation, Background, Assessment and   Recommendations(SBAR). Information from the following reports was reviewed: Kardex, Procedure Summary, MAR and Recent Results. Opportunity for questions and clarification was provided. Assessment completed upon patients arrival to unit and care assumed. Patient received to room 333 and assessment completed. Patient connected to telemetry monitor and eagle with BP cycling every 15 minutes. Patient oriented to room and plan of care reviewed. Patient voiced understanding of keeping wrist immobilized. right radial site benign, dressing dry and intact, no hematoma; R band in place. Per report, Pt has 13 ML air in band. Patient provided with clear liquids. Patient aware to use call light to communicate needs. Instructed patient to not use arm for any pushing or pulling. Upon arrival to the floor pt stated that she had some numbness just below the band. 2 ML air removed at the time patient arrived from cath lab.

## 2018-03-10 NOTE — PROGRESS NOTES
Patient reported 8/10 chest pain at shift change. IV morphine was given by Danyel Barrett at 7983. Diane Dao NP was called and an EKG was ordered. At 1948 1 SL nitroglycerin was given. BP was 100/62. Patient reported feeling nauseous with nitroglycerin and vomited a small amount. 4 mg Zofran ordered. Zofran and ativan were given at [address] At 2000, patient's family brought in a hamburger meal, salad, and milk shake. Patient ate meal with no problems or nausea. Patient reported pain 5/10 at 2100. Patient reported worsening chest pain at 2208 with a pain level of 8/10. BP was 90/48. 1 SL nitroglycerin was given. Patient did not have immediate nausea this time. At 2214 pain was reported at remaining 8/10 and 0.9% normal saline was started at 100 mL/hr. BP at this time was 84/48.  Will continue to monitor

## 2018-03-10 NOTE — PROGRESS NOTES
Call placed to [facility]. Pt has returned for clean cath.   States that she still is having some discomfort in her chest.  Nery Bermeo stated to hold pain meds at this time and give a GI coctail

## 2018-03-26 PROBLEM — I25.110 CORONARY ARTERY DISEASE INVOLVING NATIVE CORONARY ARTERY OF NATIVE HEART WITH UNSTABLE ANGINA PECTORIS (HCC): Status: RESOLVED | Noted: 2018-03-09 | Resolved: 2018-03-26

## 2018-03-26 PROBLEM — I10 HTN (HYPERTENSION): Chronic | Status: ACTIVE | Noted: 2018-03-09

## 2018-03-26 PROBLEM — I25.10 CAD (CORONARY ARTERY DISEASE): Status: RESOLVED | Noted: 2018-03-09 | Resolved: 2018-03-26

## 2018-07-18 ENCOUNTER — HOSPITAL ENCOUNTER (INPATIENT)
Age: 48
LOS: 4 days | Discharge: HOME | DRG: 190 | End: 2018-07-22
Attending: EMERGENCY MEDICINE | Admitting: INTERNAL MEDICINE
Payer: COMMERCIAL

## 2018-07-18 DIAGNOSIS — J44.1 COPD EXACERBATION (HCC): Primary | ICD-10-CM

## 2018-07-18 DIAGNOSIS — A41.9 SEPSIS, DUE TO UNSPECIFIED ORGANISM: ICD-10-CM

## 2018-07-18 DIAGNOSIS — Z72.0 TOBACCO ABUSE: ICD-10-CM

## 2018-07-18 PROBLEM — D72.829 LEUCOCYTOSIS: Status: ACTIVE | Noted: 2018-07-18

## 2018-07-18 PROBLEM — N63.20 LEFT BREAST LUMP: Status: ACTIVE | Noted: 2018-07-18

## 2018-07-18 PROBLEM — J21.9 ACUTE BRONCHIOLITIS WITH BRONCHOSPASM: Status: ACTIVE | Noted: 2018-07-18

## 2018-07-18 PROBLEM — J96.01 ACUTE RESPIRATORY FAILURE WITH HYPOXEMIA (HCC): Status: ACTIVE | Noted: 2018-07-18

## 2018-07-18 PROBLEM — E87.20 LACTIC ACIDOSIS: Status: ACTIVE | Noted: 2018-07-18

## 2018-07-18 LAB
APPEARANCE UR: CLEAR
ARTERIAL PATENCY WRIST A: POSITIVE
ATRIAL RATE: 45 BPM
BACTERIA URNS QL MICRO: 0 /HPF
BASE DEFICIT BLDA-SCNC: 5.5 MMOL/L (ref 0–2)
BDY SITE: ABNORMAL
BILIRUB UR QL: NEGATIVE
CALCULATED P AXIS, ECG09: 58 DEGREES
CALCULATED R AXIS, ECG10: 63 DEGREES
CALCULATED T AXIS, ECG11: 91 DEGREES
CASTS URNS QL MICRO: ABNORMAL /LPF
COHGB MFR BLD: 2 % (ref 0.5–1.5)
COLOR UR: YELLOW
DIAGNOSIS, 93000: NORMAL
DO-HGB BLD-MCNC: 7 % (ref 0–5)
EPI CELLS #/AREA URNS HPF: ABNORMAL /HPF
GLUCOSE UR STRIP.AUTO-MCNC: NEGATIVE MG/DL
HCO3 BLDA-SCNC: 19 MMOL/L (ref 22–26)
HGB BLDMV-MCNC: 13.7 GM/DL (ref 11.7–15)
HGB UR QL STRIP: ABNORMAL
KETONES UR QL STRIP.AUTO: NEGATIVE MG/DL
LACTATE BLD-SCNC: 3.4 MMOL/L (ref 0.5–1.9)
LACTATE SERPL-SCNC: 3.9 MMOL/L (ref 0.4–2)
LACTATE SERPL-SCNC: 3.9 MMOL/L (ref 0.4–2)
LEUKOCYTE ESTERASE UR QL STRIP.AUTO: NEGATIVE
METHGB MFR BLD: 0.4 % (ref 0–1.5)
NITRITE UR QL STRIP.AUTO: NEGATIVE
OXYHGB MFR BLDA: 91.1 % (ref 94–97)
P-R INTERVAL, ECG05: 130 MS
PCO2 BLDA: 33 MMHG (ref 35–45)
PH BLDA: 7.37 [PH] (ref 7.35–7.45)
PH UR STRIP: 5 [PH] (ref 5–9)
PO2 BLDA: 69 MMHG (ref 80–105)
PROT UR STRIP-MCNC: NEGATIVE MG/DL
Q-T INTERVAL, ECG07: 494 MS
QRS DURATION, ECG06: 92 MS
QTC CALCULATION (BEZET), ECG08: 427 MS
RBC #/AREA URNS HPF: ABNORMAL /HPF
SAO2 % BLD: 93 % (ref 92–98.5)
SP GR UR REFRACTOMETRY: 1.01 (ref 1–1.02)
UROBILINOGEN UR QL STRIP.AUTO: 0.2 EU/DL (ref 0.2–1)
VENTRICULAR RATE, ECG03: 45 BPM
WBC URNS QL MICRO: 0 /HPF

## 2018-07-18 PROCEDURE — 76937 US GUIDE VASCULAR ACCESS: CPT

## 2018-07-18 PROCEDURE — C1751 CATH, INF, PER/CENT/MIDLINE: HCPCS

## 2018-07-18 PROCEDURE — 74011000250 HC RX REV CODE- 250: Performed by: EMERGENCY MEDICINE

## 2018-07-18 PROCEDURE — 81001 URINALYSIS AUTO W/SCOPE: CPT | Performed by: INTERNAL MEDICINE

## 2018-07-18 PROCEDURE — 74011250636 HC RX REV CODE- 250/636: Performed by: EMERGENCY MEDICINE

## 2018-07-18 PROCEDURE — 74011250637 HC RX REV CODE- 250/637: Performed by: INTERNAL MEDICINE

## 2018-07-18 PROCEDURE — 83605 ASSAY OF LACTIC ACID: CPT

## 2018-07-18 PROCEDURE — 82803 BLOOD GASES ANY COMBINATION: CPT

## 2018-07-18 PROCEDURE — 96365 THER/PROPH/DIAG IV INF INIT: CPT | Performed by: EMERGENCY MEDICINE

## 2018-07-18 PROCEDURE — 74011000250 HC RX REV CODE- 250: Performed by: INTERNAL MEDICINE

## 2018-07-18 PROCEDURE — 36600 WITHDRAWAL OF ARTERIAL BLOOD: CPT

## 2018-07-18 PROCEDURE — 83605 ASSAY OF LACTIC ACID: CPT | Performed by: INTERNAL MEDICINE

## 2018-07-18 PROCEDURE — 94760 N-INVAS EAR/PLS OXIMETRY 1: CPT

## 2018-07-18 PROCEDURE — 99284 EMERGENCY DEPT VISIT MOD MDM: CPT | Performed by: EMERGENCY MEDICINE

## 2018-07-18 PROCEDURE — 93005 ELECTROCARDIOGRAM TRACING: CPT | Performed by: EMERGENCY MEDICINE

## 2018-07-18 PROCEDURE — 94640 AIRWAY INHALATION TREATMENT: CPT

## 2018-07-18 PROCEDURE — 74011250636 HC RX REV CODE- 250/636: Performed by: INTERNAL MEDICINE

## 2018-07-18 PROCEDURE — 65270000029 HC RM PRIVATE

## 2018-07-18 PROCEDURE — 87040 BLOOD CULTURE FOR BACTERIA: CPT | Performed by: EMERGENCY MEDICINE

## 2018-07-18 PROCEDURE — 36415 COLL VENOUS BLD VENIPUNCTURE: CPT | Performed by: INTERNAL MEDICINE

## 2018-07-18 RX ORDER — BUDESONIDE 0.5 MG/2ML
500 INHALANT ORAL
Status: DISCONTINUED | OUTPATIENT
Start: 2018-07-18 | End: 2018-07-22 | Stop reason: HOSPADM

## 2018-07-18 RX ORDER — SODIUM CHLORIDE 0.9 % (FLUSH) 0.9 %
10 SYRINGE (ML) INJECTION EVERY 8 HOURS
Status: DISCONTINUED | OUTPATIENT
Start: 2018-07-18 | End: 2018-07-22 | Stop reason: HOSPADM

## 2018-07-18 RX ORDER — IPRATROPIUM BROMIDE AND ALBUTEROL SULFATE 2.5; .5 MG/3ML; MG/3ML
3 SOLUTION RESPIRATORY (INHALATION)
Status: DISCONTINUED | OUTPATIENT
Start: 2018-07-18 | End: 2018-07-22 | Stop reason: HOSPADM

## 2018-07-18 RX ORDER — ACETAMINOPHEN 325 MG/1
650 TABLET ORAL
Status: DISCONTINUED | OUTPATIENT
Start: 2018-07-18 | End: 2018-07-22 | Stop reason: HOSPADM

## 2018-07-18 RX ORDER — SODIUM CHLORIDE 0.9 % (FLUSH) 0.9 %
5-10 SYRINGE (ML) INJECTION EVERY 8 HOURS
Status: DISCONTINUED | OUTPATIENT
Start: 2018-07-18 | End: 2018-07-22 | Stop reason: HOSPADM

## 2018-07-18 RX ORDER — IPRATROPIUM BROMIDE AND ALBUTEROL SULFATE 2.5; .5 MG/3ML; MG/3ML
3 SOLUTION RESPIRATORY (INHALATION)
Status: COMPLETED | OUTPATIENT
Start: 2018-07-18 | End: 2018-07-18

## 2018-07-18 RX ORDER — LORATADINE 10 MG/1
10 TABLET ORAL DAILY
Status: DISCONTINUED | OUTPATIENT
Start: 2018-07-19 | End: 2018-07-22 | Stop reason: HOSPADM

## 2018-07-18 RX ORDER — IBUPROFEN 200 MG
1 TABLET ORAL DAILY
Status: DISCONTINUED | OUTPATIENT
Start: 2018-07-19 | End: 2018-07-18

## 2018-07-18 RX ORDER — ENOXAPARIN SODIUM 100 MG/ML
40 INJECTION SUBCUTANEOUS EVERY 24 HOURS
Status: DISCONTINUED | OUTPATIENT
Start: 2018-07-18 | End: 2018-07-22 | Stop reason: HOSPADM

## 2018-07-18 RX ORDER — FAMOTIDINE 20 MG/1
20 TABLET, FILM COATED ORAL 2 TIMES DAILY
Status: DISCONTINUED | OUTPATIENT
Start: 2018-07-18 | End: 2018-07-22 | Stop reason: HOSPADM

## 2018-07-18 RX ORDER — SODIUM CHLORIDE 0.9 % (FLUSH) 0.9 %
10 SYRINGE (ML) INJECTION AS NEEDED
Status: DISCONTINUED | OUTPATIENT
Start: 2018-07-18 | End: 2018-07-22 | Stop reason: HOSPADM

## 2018-07-18 RX ORDER — IBUPROFEN 200 MG
1 TABLET ORAL DAILY
Status: DISCONTINUED | OUTPATIENT
Start: 2018-07-18 | End: 2018-07-22 | Stop reason: HOSPADM

## 2018-07-18 RX ORDER — SODIUM CHLORIDE 9 MG/ML
100 INJECTION, SOLUTION INTRAVENOUS CONTINUOUS
Status: DISCONTINUED | OUTPATIENT
Start: 2018-07-18 | End: 2018-07-21

## 2018-07-18 RX ORDER — HYDROCODONE BITARTRATE AND ACETAMINOPHEN 7.5; 325 MG/15ML; MG/15ML
10 SOLUTION ORAL
Status: DISCONTINUED | OUTPATIENT
Start: 2018-07-18 | End: 2018-07-19

## 2018-07-18 RX ORDER — SODIUM CHLORIDE 0.9 % (FLUSH) 0.9 %
5-10 SYRINGE (ML) INJECTION AS NEEDED
Status: DISCONTINUED | OUTPATIENT
Start: 2018-07-18 | End: 2018-07-22 | Stop reason: HOSPADM

## 2018-07-18 RX ORDER — ONDANSETRON 2 MG/ML
4 INJECTION INTRAMUSCULAR; INTRAVENOUS
Status: DISCONTINUED | OUTPATIENT
Start: 2018-07-18 | End: 2018-07-22 | Stop reason: HOSPADM

## 2018-07-18 RX ORDER — IPRATROPIUM BROMIDE AND ALBUTEROL SULFATE 2.5; .5 MG/3ML; MG/3ML
3 SOLUTION RESPIRATORY (INHALATION)
Status: DISCONTINUED | OUTPATIENT
Start: 2018-07-18 | End: 2018-07-19

## 2018-07-18 RX ORDER — NALOXONE HYDROCHLORIDE 0.4 MG/ML
0.4 INJECTION, SOLUTION INTRAMUSCULAR; INTRAVENOUS; SUBCUTANEOUS AS NEEDED
Status: DISCONTINUED | OUTPATIENT
Start: 2018-07-18 | End: 2018-07-22 | Stop reason: HOSPADM

## 2018-07-18 RX ORDER — BISACODYL 5 MG
5 TABLET, DELAYED RELEASE (ENTERIC COATED) ORAL DAILY PRN
Status: DISCONTINUED | OUTPATIENT
Start: 2018-07-18 | End: 2018-07-22 | Stop reason: HOSPADM

## 2018-07-18 RX ORDER — DIPHENHYDRAMINE HYDROCHLORIDE 50 MG/ML
12.5 INJECTION, SOLUTION INTRAMUSCULAR; INTRAVENOUS
Status: DISCONTINUED | OUTPATIENT
Start: 2018-07-18 | End: 2018-07-22 | Stop reason: HOSPADM

## 2018-07-18 RX ORDER — LEVOFLOXACIN 5 MG/ML
500 INJECTION, SOLUTION INTRAVENOUS EVERY 24 HOURS
Status: DISCONTINUED | OUTPATIENT
Start: 2018-07-18 | End: 2018-07-19

## 2018-07-18 RX ORDER — PANTOPRAZOLE SODIUM 40 MG/1
40 TABLET, DELAYED RELEASE ORAL
Status: DISCONTINUED | OUTPATIENT
Start: 2018-07-18 | End: 2018-07-22 | Stop reason: HOSPADM

## 2018-07-18 RX ADMIN — ENOXAPARIN SODIUM 40 MG: 40 INJECTION, SOLUTION INTRAVENOUS; SUBCUTANEOUS at 16:45

## 2018-07-18 RX ADMIN — IPRATROPIUM BROMIDE AND ALBUTEROL SULFATE 3 ML: .5; 3 SOLUTION RESPIRATORY (INHALATION) at 19:50

## 2018-07-18 RX ADMIN — FAMOTIDINE 20 MG: 20 TABLET ORAL at 17:44

## 2018-07-18 RX ADMIN — SODIUM CHLORIDE 1000 ML: 900 INJECTION, SOLUTION INTRAVENOUS at 14:36

## 2018-07-18 RX ADMIN — IPRATROPIUM BROMIDE AND ALBUTEROL SULFATE 3 ML: .5; 3 SOLUTION RESPIRATORY (INHALATION) at 13:33

## 2018-07-18 RX ADMIN — Medication 5 ML: at 16:45

## 2018-07-18 RX ADMIN — IPRATROPIUM BROMIDE AND ALBUTEROL SULFATE 3 ML: .5; 3 SOLUTION RESPIRATORY (INHALATION) at 23:25

## 2018-07-18 RX ADMIN — Medication 10 ML: at 22:00

## 2018-07-18 RX ADMIN — METHYLPREDNISOLONE SODIUM SUCCINATE 40 MG: 40 INJECTION, POWDER, FOR SOLUTION INTRAMUSCULAR; INTRAVENOUS at 21:15

## 2018-07-18 RX ADMIN — BUDESONIDE 500 MCG: 0.5 INHALANT RESPIRATORY (INHALATION) at 19:50

## 2018-07-18 RX ADMIN — SODIUM CHLORIDE 150 ML/HR: 900 INJECTION, SOLUTION INTRAVENOUS at 21:17

## 2018-07-18 RX ADMIN — LEVOFLOXACIN 500 MG: 5 INJECTION, SOLUTION INTRAVENOUS at 18:46

## 2018-07-18 RX ADMIN — Medication 10 ML: at 21:17

## 2018-07-18 RX ADMIN — AZITHROMYCIN MONOHYDRATE 500 MG: 500 INJECTION, POWDER, LYOPHILIZED, FOR SOLUTION INTRAVENOUS at 14:43

## 2018-07-18 RX ADMIN — SODIUM CHLORIDE 150 ML/HR: 900 INJECTION, SOLUTION INTRAVENOUS at 18:36

## 2018-07-18 RX ADMIN — METHYLPREDNISOLONE SODIUM SUCCINATE 40 MG: 40 INJECTION, POWDER, FOR SOLUTION INTRAMUSCULAR; INTRAVENOUS at 18:47

## 2018-07-18 RX ADMIN — SODIUM CHLORIDE 1000 ML: 900 INJECTION, SOLUTION INTRAVENOUS at 20:00

## 2018-07-18 NOTE — ED TRIAGE NOTES
Pt states having a productive cough since Sunday. Pt went to urgent care and had blood work and an chest x ray done. Pt states she was sent to the hospital because she has pneumonia and needs to be admitted.

## 2018-07-18 NOTE — ED NOTES
TRANSFER - OUT REPORT:    Verbal report given to Whitley(name) on Letty Leong  being transferred to 809(unit) for routine progression of care       Report consisted of patients Situation, Background, Assessment and   Recommendations(SBAR). Information from the following report(s) SBAR, ED Summary, [facility] and Recent Results was reviewed with the receiving nurse. Lines:   Peripheral IV 07/18/18 Right Hand (Active)   Site Assessment Clean, dry, & intact 7/18/2018  1:15 PM   Phlebitis Assessment 0 7/18/2018  1:15 PM   Infiltration Assessment 0 7/18/2018  1:15 PM   Dressing Status Clean, dry, & intact 7/18/2018  1:15 PM   Alcohol Cap Used No 7/18/2018  1:15 PM        Opportunity for questions and clarification was provided.       Patient transported with:   [employer]

## 2018-07-18 NOTE — PROGRESS NOTES
MIDLINE Placement Note PRE-PROCEDURE VERIFICATION 
PROCEDURE DETAIL Time out completed all person present in agreement with time out. A single lumen Midline was started for vascular access. The following documentation is in addition to the Midline properties in the lines/airways flowsheet : 
Lot #: [id number] Xylocaine used: no 
Mid-Arm Circumference: 34 (cm) Internal Catheter Length: 10 (cm) Internal Catheter Total Length: 10 (cm) Vein Selection for Midline:left basilic Line is okay to use: yes.  
 
Grady Parnell RN, VAT

## 2018-07-18 NOTE — IP AVS SNAPSHOT
Summary of Care Report The Summary of Care report has been created to help improve care coordination. Users with access to [employer] or [employer] (Web-based application) may access additional patient information including the Discharge Summary. If you are not currently a [employer] user and need more information, please call the number listed below in the [place] [address] section and ask to be connected with Medical Records. Facility Information Name Address Phone [address] [address] [address] [phone] Patient Information Patient Name Sex MAURICE Pickett ([id number]) Female 1970 Discharge Information Admitting Provider Service Area Unit Leonora Blue MD / [address] [facility] / [phone] Discharge Provider Discharge Date/Time Discharge Disposition Destination (none) 2018 (Pending) AHR (none) Patient Language Language ENGLISH [13] Hospital Problems as of 2018  Reviewed: 3/26/2018  8:59 AM by SLIM Newby Class Noted - Resolved Last Modified POA Active Problems Tobacco abuse  3/10/2018 - Present 2018 by Nancy Lyons MD Yes Entered by SLIM Fischer Acute exacerbation of chronic obstructive pulmonary disease (COPD) ([facility] [address]  2018 - Present 2018 by Nancy Lyons MD Yes Entered by aNncy Lyons MD  
  Lactic acidosis  2018 - Present 2018 by Nancy Lyons MD Yes Entered by Nancy Lyons MD  
  Leucocytosis  2018 - Present 2018 by Nancy Lyons MD Yes Entered by Nancy Lyons MD  
  Acute bronchiolitis with bronchospasm  2018 - Present 2018 by Nancy Lyons MD Yes   Entered by Nancy Lyons MD  
  * (Principal)Acute respiratory failure with hypoxemia ([address]  2018 - Present 7/18/2018 by Pooja Mancia MD Yes Entered by Pooja Mancia MD  
  Left breast lump  7/18/2018 - Present 7/18/2018 by Pooja Mancia MD Yes Entered by Pooja Mancia MD  
  Atypical pneumonia  7/19/2018 - Present 7/19/2018 by Nickie Rosen MD Unknown Entered by Nickie Rosen MD  
  [facility])  7/19/2018 - Present 7/19/2018 by Nickie Rosen MD Unknown Entered by Nickie Rosen MD  
  
Non-Hospital Problems as of 7/22/2018  Reviewed: 3/26/2018  8:59 AM by SLIM Hernandez Class Noted - Resolved Last Modified Active Problems HTN (hypertension) (Chronic)  3/9/2018 - Present 3/26/2018 by SLIM Hernandez Entered by Andrew Gee MD  
  COPD (chronic obstructive pulmonary disease) ([facility] [address]  3/9/2018 - Present 3/9/2018 by Andrew Gee MD  
  Entered by Andrew Gee MD  
  Chest pain  3/9/2018 - Present 3/10/2018 by SLIM Nieves Entered by Jefferson Gonzalez NP Non-cardiac chest pain  3/10/2018 - Present 3/10/2018 by SLIM Nieves Entered by SLIM Nieves You are allergic to the following Allergen Reactions Iodinated Contrast- Oral And Iv Dye Hives Keflex (Cephalexin) Hives Current Discharge Medication List  
  
START taking these medications Dose & Instructions Dispensing Information Comments ALPRAZolam 0.25 mg tablet Commonly known as:  Judy Lacey Dose:  0.25 mg Take 1 Tab by mouth two (2) times daily as needed for Anxiety. Max Daily Amount: 0.5 mg. Do not use this medication together with tramadol. Allow a 2 hr window Quantity:  30 Tab Refills:  0  
   
 benzonatate 100 mg capsule Commonly known as:  TESSALON Dose:  100 mg Take 1 Cap by mouth three (3) times daily as needed for Cough for up to 5 days. Quantity:  15 Cap Refills:  0  
   
 guaiFENesin  mg ER tablet Commonly known as:  Ranjeet & Ranjeet  Dose:  600 mg  
 Take 1 Tab by mouth every twelve (12) hours for 7 days. Quantity:  14 Tab Refills:  0  
   
 guaiFENesin-dextromethorphan  mg/5 mL Liqd Commonly known as:  TUSSI-ORGANIDIN DM Dose:  10 mL Take 10 mL by mouth every six (6) hours as needed. Quantity:  1 Bottle Refills:  0  
   
 inhalational spacing device Dose:  1 Each  
1 Each by Does Not Apply route as needed. Quantity:  1 Device Refills:  0  
   
 levoFLOXacin 750 mg tablet Commonly known as:  Heath Screven Dose:  750 mg Take 1 Tab by mouth every twenty-four (24) hours for 3 days. Quantity:  3 Tab Refills:  0  
   
 nicotine 21 mg/24 hr  
Commonly known as:  Erskin Neither Start taking on:  7/23/2018 Dose:  1 Patch 1 Patch by TransDERmal route daily for 30 days. Quantity:  30 Patch Refills:  0  
   
 pantoprazole 40 mg tablet Commonly known as:  PROTONIX Start taking on:  7/23/2018 Dose:  40 mg Take 1 Tab by mouth Daily (before breakfast) for 10 days. Quantity:  10 Tab Refills:  0  
   
 predniSONE 20 mg tablet Commonly known as:  Angel Gelineau Take 2 tablets every day before breakfast for 3 days, then Take 1 tablet every day BB for 3 days, then Take half a tablet every day BB for 3 days, then stop Quantity:  11 Tab Refills:  0  
   
 traMADol 50 mg tablet Commonly known as:  ULTRAM  
 Dose:  50 mg Take 1 Tab by mouth every six (6) hours as needed. Max Daily Amount: 200 mg. Do not use this medication together with xanax. Allow a 2 hour window Quantity:  20 Tab Refills:  0 CONTINUE these medications which have CHANGED Dose & Instructions Dispensing Information Comments  
 albuterol 90 mcg/actuation inhaler Commonly known as:  PROAIR HFA What changed:   
- how much to take - when to take this 
- reasons to take this Dose:  2 Puff Take 2 Puffs by inhalation every six (6) hours as needed for Wheezing. Quantity:  1 Inhaler Refills:  1 CONTINUE these medications which have NOT CHANGED Dose & Instructions Dispensing Information Comments  
 famotidine 20 mg tablet Commonly known as:  PEPCID Dose:  20 mg Take 1 Tab by mouth two (2) times a day. Quantity:  60 Tab Refills:  1  
   
 fluticasone 50 mcg/actuation nasal spray Commonly known as:  Karyna Wilson Dose:  2 [place] 2 Sprays by Both Nostrils route daily. Quantity:  1 Bottle Refills:  11  
   
 fluticasone-vilanterol 200-25 mcg/dose inhaler Commonly known as:  BREO ELLIPTA Dose:  1 Puff Take 1 Puff by inhalation daily. RINSE MOUTH WELL AFTER USE Quantity:  1 Inhaler Refills:  11  
   
 loratadine 10 mg tablet Commonly known as:  Orsette Pace Dose:  10 mg Take 1 Tab by mouth daily. Quantity:  30 Tab Refills:  11 Follow-up Information Follow up With Details Comments Contact Info Not On File Bsi  please call your primary care doctor on Monday and schedule a follow up appt for a week out  Not On File (58) Patient has a PCP but that physician is not listed in [facility]. Discharge Instructions DISCHARGE SUMMARY from Nurse PATIENT INSTRUCTIONS: 
 
After general anesthesia or intravenous sedation, for 24 hours or while taking prescription Narcotics: · Limit your activities · Do not drive and operate hazardous machinery · Do not make important personal or business decisions · Do  not drink alcoholic beverages · If you have not urinated within 8 hours after discharge, please contact your surgeon on call. Report the following to your surgeon: 
· Excessive pain, swelling, redness or odor of or around the surgical area · Temperature over 100.5 · Nausea and vomiting lasting longer than 4 hours or if unable to take medications · Any signs of decreased circulation or nerve impairment to extremity: change in color, persistent  numbness, tingling, coldness or increase pain · Any questions What to do at Home: Recommended activity: Activity as tolerated If you experience any of the following symptoms shortness of breath not relieved by rest, pain not relieved by medication, chest pain or pressure, temperature greater than 101, nausea, vomiting, diarrhea, or increase in weakness fatigue or swelling please follow up with MD. 
 
*  Please give a list of your current medications to your Primary Care Provider. *  Please update this list whenever your medications are discontinued, doses are 
    changed, or new medications (including over-the-counter products) are added. *  Please carry medication information at all times in case of emergency situations. These are general instructions for a healthy lifestyle: No smoking/ No tobacco products/ Avoid exposure to second hand smoke Surgeon General's Warning:  Quitting smoking now greatly reduces serious risk to your health. Obesity, smoking, and sedentary lifestyle greatly increases your risk for illness A healthy diet, regular physical exercise & weight monitoring are important for maintaining a healthy lifestyle You may be retaining fluid if you have a history of heart failure or if you experience any of the following symptoms:  Weight gain of 3 pounds or more overnight or 5 pounds in a week, increased swelling in our hands or feet or shortness of breath while lying flat in bed. Please call your doctor as soon as you notice any of these symptoms; do not wait until your next office visit. Recognize signs and symptoms of STROKE: 
 
F-face looks uneven A-arms unable to move or move unevenly S-speech slurred or non-existent T-time-call 911 as soon as signs and symptoms begin-DO NOT go Back to bed or wait to see if you get better-TIME IS BRAIN. Warning Signs of HEART ATTACK Call 911 if you have these symptoms: 
? Chest discomfort.  Most heart attacks involve discomfort in the center of the chest that lasts more than a few minutes, or that goes away and comes back. It can feel like uncomfortable pressure, squeezing, fullness, or pain. ? Discomfort in other areas of the upper body. Symptoms can include pain or discomfort in one or both arms, the back, neck, jaw, or stomach. ? Shortness of breath with or without chest discomfort. ? Other signs may include breaking out in a cold sweat, nausea, or lightheadedness. Don't wait more than five minutes to call [address]! Fast action can save your life. Calling 911 is almost always the fastest way to get lifesaving treatment. Emergency Medical Services staff can begin treatment when they arrive  up to an hour sooner than if someone gets to the hospital by car. The discharge information has been reviewed with the patient and spouse. The patient and spouse verbalized understanding. Discharge medications reviewed with the patient and spouse and appropriate educational materials and side effects teaching were provided. ___________________________________________________________________________________________________________________________________ Shortness of Breath: Care Instructions Your Care Instructions Shortness of breath has many causes. Sometimes conditions such as anxiety can lead to shortness of breath. Some people get mild shortness of breath when they exercise. Trouble breathing also can be a symptom of a serious problem, such as asthma, lung disease, emphysema, heart problems, and pneumonia. If your shortness of breath continues, you may need tests and treatment. Watch for any changes in your breathing and other symptoms. Follow-up care is a key part of your treatment and safety. Be sure to make and go to all appointments, and call your doctor if you are having problems. It's also a good idea to know your test results and keep a list of the medicines you take. How can you care for yourself at home? · Do not smoke or allow others to smoke around you. If you need help quitting, talk to your doctor about stop-smoking programs and medicines. These can increase your chances of quitting for good. · Get plenty of rest and sleep. · Take your medicines exactly as prescribed. Call your doctor if you think you are having a problem with your medicine. · Find healthy ways to deal with stress. ¨ Exercise daily. ¨ Get plenty of sleep. ¨ Eat regularly and well. When should you call for help? Call 911 anytime you think you may need emergency care. For example, call if: 
  · You have severe shortness of breath.  
  · You have symptoms of a heart attack. These may include: ¨ Chest pain or pressure, or a strange feeling in the chest. 
¨ Sweating. ¨ Shortness of breath. ¨ Nausea or vomiting. ¨ Pain, pressure, or a strange feeling in the back, neck, jaw, or upper belly or in one or both shoulders or arms. ¨ Lightheadedness or sudden weakness. ¨ A fast or irregular heartbeat. After you call 911, the  may tell you to chew 1 adult-strength or 2 to 4 low-dose aspirin. Wait for an ambulance. Do not try to drive yourself.  
 Call your doctor now or seek immediate medical care if: 
  · Your shortness of breath gets worse or you start to wheeze. Wheezing is a high-pitched sound when you breathe.  
  · You wake up at night out of breath or have to prop your head up on several pillows to breathe.  
  · You are short of breath after only light activity or while at rest.  
 Watch closely for changes in your health, and be sure to contact your doctor if: 
  · You do not get better over the next 1 to 2 days. Where can you learn more? Go to http://jayna-scooter.info/. Enter S780 in the search box to learn more about \"Shortness of Breath: Care Instructions. \" Current as of: December 6, 2017 Content Version: 11.7 © 5844-1839 [employer].  Care instructions adapted under license by [address] (which disclaims liability or warranty for this information). If you have questions about a medical condition or this instruction, always ask your healthcare professional. [place] [address] any warranty or liability for your use of this information. Chart Review Routing History No Routing History on File

## 2018-07-18 NOTE — H&P
HOSPITALIST H&P/CONSULT 
NAME:  Lonnie Flores Age:  46 y.o. 
:   1970 MRN:   [id number] PCP: Not On File [facility] Consulting MD: Treatment Team: Attending Provider: Fernanda Farrell MD; Utilization Review: Patricia Talavera RN 
HPI:  
50yo F with PMH od COPD (not on home O2), Tobacco abuse, seasonal allergies, Breast Lump (benign) was transferred from [facility] for COPD exacerbation and Bronchitis. Pt started having SOB and wheezing 3 days ago. SOB was described as gradual onset, persistent, worse with exertion, no relieving factors, associated with cough productive of brownish sputum and fever 103.8. She went to ER at [facility] and was sent home on doxycycline and Prednisone. Her condition worsened over the next two days and she went to [facility] where her CXR was clear and she had severe wheezing with SOB. She was given decadron 10mg, Nebs and Rocephin 1g IV and sent to [facility] ER. Pt continued to wheeze and cough and LA done here was 3.4, ABG showed Hypoxemia with PO2 of 69 and PCO2 was 33. Hospitalist asked to admit Pt. Pt Feels a bit better but still SOB and wheezing. Also reports urine Is foul smelling, Appeared dark and has pelvic discomfort. Denies Vomiting, diarrhea, dizziness. Has some chest tightness and related it to wheezing and cough. Fiance at bedside providing history. He reports pt has a recurrent breast lump in left breast. Pt says she had mammogram scheduled but couldn't go, and is planning to reschedule soon. Her PCP is aware about the breast lump. Continues to smoke a pack per day, has tried Chantix but that didn't work. Chart reviewed from [facility] included Labs and CXR report. Case was discussed with ER MD. 
10 point ROS done and is negative except as noted in HPI. Past Medical History:  
Diagnosis Date  Breast cancer ([address]  COPD (chronic obstructive pulmonary disease) (HCC)  Tobacco dependence due to cigarettes Past Surgical History:  
Procedure Laterality Date  HX APPENDECTOMY  HX BREAST LUMPECTOMY  2016 [address]  HX CHOLECYSTECTOMY Prior to Admission Medications Prescriptions Last Dose Informant Patient Reported? Taking? albuterol (PROAIR HFA) 90 mcg/actuation inhaler   Yes No  
Sig: Take  by inhalation. famotidine (PEPCID) 20 mg tablet   No No  
Sig: Take 1 Tab by mouth two (2) times a day. fluticasone (FLONASE) 50 mcg/actuation nasal spray   No No  
Si Sprays by Both Nostrils route daily. fluticasone-vilanterol (BREO ELLIPTA) 200-25 mcg/dose inhaler   No No  
Sig: Take 1 Puff by inhalation daily. RINSE MOUTH WELL AFTER USE  
loratadine (CLARITIN) 10 mg tablet   No No  
Sig: Take 1 Tab by mouth daily. varenicline (CHANTIX CONTINUING MONTH SHAAN) 1 mg tablet   No No  
Sig: Take 1 Tab by mouth two (2) times a day. varenicline (CHANTIX STARTING MONTH BOX) 0.5 mg (11)- 1 mg (42) DsPk   No No  
Sig: Days 1 to 3: 0.5mg once a day Days 4 to 7: 0.5mg twice a day Day 8 and after: 1mg twice a day. Indications: Smoking Cessation Facility-Administered Medications: None Home meds reconciled. Allergies Allergen Reactions  Iodinated Contrast- Oral And Iv Dye Hives  Keflex [Cephalexin] Hives Social History Substance Use Topics  Smoking status: Current Every Day Smoker Packs/day: 1.00 Years: 33.00 Types: Cigarettes  Smokeless tobacco: Never Used  Alcohol use Not on file Family History Problem Relation Age of Onset  Diabetes Mother  Heart Disease Father There is no immunization history on file for this patient. Objective:  
 
Visit Vitals  /73 (BP 1 Location: Left arm, BP Patient Position: At rest)  Pulse 79  Temp 97.8 °F (36.6 °C)  Resp 18  Ht 5' 7\" (1.702 m)  Wt 68 kg (150 lb)  SpO2 96%  BMI 23.49 kg/m2 Temp (24hrs), Av.8 °F (36.6 °C), Min:97.7 °F (36.5 °C), Max:97.8 °F (36.6 °C) 
 
Oxygen Therapy O2 Sat (%): 96 % (07/18/18 1626) Pulse via Oximetry: 70 beats per minute (07/18/18 1334) O2 Device: Room air (07/18/18 1334) Physical Exam: 
General:    Alert, cooperative, no distress Head:   NCAT. No obvious deformity Nose:  Nares normal. No drainage Lungs:   B/l wheezing Heart:   RRR. No m/r/g. Abdomen:   S/nt/nd. Bowel sounds normal.  
Extremities: No cyanosis. Skin:     No rashes or lesions. Not Jaundiced Neurologic: Moves all extremities. no gross focal deficits Data Review:  
Recent Results (from the past 24 hour(s)) EKG, 12 LEAD, INITIAL Collection Time: 07/18/18  1:16 PM  
Result Value Ref Range Ventricular Rate 45 BPM  
 Atrial Rate 45 BPM  
 P-R Interval 130 ms QRS Duration 92 ms Q-T Interval 494 ms QTC Calculation (Bezet) 427 ms Calculated P Axis 58 degrees Calculated R Axis 63 degrees Calculated T Axis 91 degrees Diagnosis Sinus bradycardia Nonspecific ST and T wave abnormality Abnormal ECG When compared with ECG of 09-MAR-2018 19:56, No significant change was found Confirmed by Bang Bee MD (), Dave Briggs ([id number]) on 7/18/2018 2:36:03 PM 
  
POC LACTIC ACID Collection Time: 07/18/18  1:31 PM  
Result Value Ref Range Lactic Acid (POC) 3.4 (H) 0.5 - 1.9 mmol/L  
BLOOD GAS, ARTERIAL Collection Time: 07/18/18  2:20 PM  
Result Value Ref Range pH 7.37 7.35 - 7.45    
 PCO2 33 (L) 35 - 45 mmHg PO2 69 (L) 80 - 105 mmHg BICARBONATE 19 (L) 22 - 26 mmol/L  
 BASE DEFICIT 5.5 (H) 0 - 2 mmol/L  
 TOTAL HEMOGLOBIN 13.7 11.7 - 15.0 GM/DL  
 O2 SAT 93 92 - 98.5 % Arterial O2 Hgb 91.1 (L) 94 - 97 % CARBOXYHEMOGLOBIN 2.0 (H) 0.5 - 1.5 % METHEMOGLOBIN 0.4 0.0 - 1.5 % DEOXYHEMOGLOBIN 7 (H) 0.0 - 5.0 % SITE RR   
 ALLENS TEST POSITIVE Imaging /Procedures /Studies: 
I personally reviewed all labs, imaging, and other studies this admission: CXR report reviewed by me. EKG reviewed by me.  Sinus Bradycardia, no ischemic changes. CXR Results  (Last 48 hours) None CT Results  (Last 48 hours) None Assessment and Plan: Active Hospital Problems Diagnosis Date Noted  Acute exacerbation of chronic obstructive pulmonary disease (COPD) ([facility] [address] 07/18/2018  Lactic acidosis 07/18/2018  Leucocytosis 07/18/2018  Acute bronchiolitis with bronchospasm 07/18/2018  Tobacco abuse 03/10/2018 PLAN 
 
· Admit to inpt medical bed · She likely has COPD exacerbation superimposed by Acute Bronchitis. Also has Lactic Acidosis and suspect Pneumonia due to fever and productive cough and may become more prominent after IVF. · Will start on IV Solumedrol, duo-nebs, pulmicort and Levaquin with Normal saline for now. Get repeat CXR in am and r/o Pneumonia. Repeat Lactate at 5:30pm. Add PPI. · Leukocytosis is likely 2/2 steroids for 3 days vs ? PNA. Follow CXR in am. Will not trend WBCs. · Consider pulm eval in am if no improvement. · Add Nicotine patch · Send UA to r/o UTI given her symptoms. · Resume appropriate home meds. · Left Breast Lump: Exam deferred, out pt mammogram and follow up with PCP. FEN:  Regular diet DVT ppx:  Lovenox Code status:  Full Code Estimated LOS:  2-3 days Risk assessment:  High risk fiven LActic acidosis, Acute resp failure. Plan of care discussed with: RN, patient and her Fiance Signed By: Sanjuan Curling, MD   
 July 18, 2018

## 2018-07-18 NOTE — PROGRESS NOTES
Dr. Lexis Howell returned call regarding patient's critical lactic acid of 3.9. MD notified that patient is getting Levaquin at this time and then will be receiving NS at 150 ml/hr. No new orders received. Will continue to monitor and will report to oncoming shift. Patient is in stable condition with no complaints at this time. Nicotine patch noted to right shoulder. Family at bedside. Call light within reach. No needs noted.

## 2018-07-18 NOTE — PROGRESS NOTES
Patient arrived to floor via stretcher from ED. Patient transferred to bed with no assistance. Patient oriented to room. Patient appears to be a well nourished female of stated age. No skin breakdown noted. Patient does have some cici-umbilicus scaring. Vital signs are stable. Patient respirations even/unlabored. Denies any pain or needs at this time. Temp: 97.8 °F (36.6 °C) (07/18/18 1626) Pulse (Heart Rate): 79 (07/18/18 1626) Resp Rate: 18 (07/18/18 1626) BP: 129/73 (07/18/18 1626) O2 Sat (%): 96 % (07/18/18 1626) Weight: 68 kg (150 lb) (07/18/18 1130)     Patient still has IV from previous facility. Nurse attempted to flush IV and IV is infiltrated. MD notified that all IV medications are on hold and awaiting IV access team to arrive and attempt to place IV site. Will continue to monitor.

## 2018-07-18 NOTE — ED PROVIDER NOTES
HPI Comments: 61-year-old female presents with complaint of shortness of breath and productive cough with yellow/brown sputum since . States she was seen  for this complaint and prescribed doxycycline, prednisone taper. Patient went to urgent care earlier today and had blood work as well as chest x-ray. Documentation sent with patient reads as negative chest x-ray. WBC 15.5 (patient has been on Steroids since ). Report subjective fever at home on yesterday. Denies chest pain, nausea, vomiting, lower extremity swelling or pain, hemoptysis, dizziness, weakness, abdominal pain. Patient states she continues to smoke daily. Patient is a 46 y.o. female presenting with shortness of breath. The history is provided by the patient. No  was used. Shortness of Breath   This is a new problem. The current episode started more than 2 days ago. The problem has not changed since onset. Associated symptoms include rhinorrhea, cough, sputum production and wheezing. Pertinent negatives include no fever, no headaches, no sore throat, no ear pain, no neck pain, no hemoptysis, no PND, no orthopnea, no chest pain, no vomiting, no abdominal pain, no rash, no leg pain and no leg swelling. She has tried beta-agonist inhalers for the symptoms. The treatment provided no relief. Past Medical History:   Diagnosis Date    Breast cancer [facility])        Past Surgical History:   Procedure Laterality Date    HX APPENDECTOMY      HX BREAST LUMPECTOMY  2016    HX  SECTION      HX CHOLECYSTECTOMY           Family History:   Problem Relation Age of Onset    Diabetes Mother     Heart Disease Father        Social History     Social History    Marital status: SINGLE     Spouse name: N/A    Number of children: N/A    Years of education: N/A     Occupational History    Not on file.      Social History Main Topics    Smoking status: Current Every Day Smoker     Packs/day: 1.00     Years: 33.00     Types: Cigarettes    Smokeless tobacco: Never Used    Alcohol use Not on file    Drug use: Not on file    Sexual activity: Not on file     Other Topics Concern    Not on file     Social History Narrative    Pt's daughter lives with her. 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 She worked in [employer] for 10 years. She has 2 dogs and 1 fish at home. She has a boyfriend. ALLERGIES: Iodinated contrast- oral and iv dye and Keflex [cephalexin]    Review of Systems   Constitutional: Negative for chills, fatigue and fever. HENT: Positive for congestion and rhinorrhea. Negative for ear pain and sore throat. Respiratory: Positive for cough, sputum production, shortness of breath and wheezing. Negative for hemoptysis. Cardiovascular: Negative for chest pain, palpitations, orthopnea, leg swelling and PND. Gastrointestinal: Negative for abdominal pain, diarrhea, nausea and vomiting. Genitourinary: Negative for dysuria, flank pain, pelvic pain, vaginal bleeding and vaginal discharge. Musculoskeletal: Negative for back pain, gait problem, joint swelling, neck pain and neck stiffness. Skin: Negative for pallor and rash. Neurological: Negative for dizziness, seizures, syncope, weakness and headaches. Psychiatric/Behavioral: Negative for agitation and confusion. Vitals:    07/18/18 1130   BP: 123/57   Pulse: 68   Resp: 18   Temp: 97.7 °F (36.5 °C)   SpO2: 99%   Weight: 68 kg (150 lb)   Height: 5' 7\" (1.702 m)            Physical Exam   Constitutional: She is oriented to person, place, and time. Patient well appearing in no acute distress. Nontoxic in appearance. HENT:   Head: Normocephalic. Mouth/Throat: Oropharynx is clear and moist.   MMM. Neck: Normal range of motion. No JVD present. No tracheal deviation present. Cardiovascular: Normal rate, regular rhythm, normal heart sounds and intact distal pulses.     Pulses 2+ and equal throughout. Pulmonary/Chest: Effort normal. She has wheezes. She has no rales. Diffuse wheezes noted to bilateral lung fields. Abdominal: Soft. There is no rebound and no guarding. Soft, NTND. No rebound or guarding. No CVAT. Musculoskeletal: Normal range of motion. She exhibits no edema. Neurological: She is alert and oriented to person, place, and time. No cranial nerve deficit. Coordination normal.   Skin: Skin is warm and dry. No rash. Psychiatric: She has a normal mood and affect. Her behavior is normal.   Nursing note and vitals reviewed. MDM  Number of Diagnoses or Management Options  COPD exacerbation [facility]): new and requires workup  Diagnosis management comments: Labs from Emergency MD:  Glucose 94, P1 16, creatinine 0.5, sodium 138, potassium 4.3, chloride 108, CO2 19, calcium 8.9, WBC 15.5, H&H 13.6/42.1, Plt 382. Blood work obtained in ED lactate 3.4. Patient is a 1 L IV fluid bolus, blood cultures obtained. Patient already given Rocephin IM at [facility]; in addition to SoluMedrol 125 mg. Will additionally give her Zithromax 500 mg IV. Patient given additional DuoNeb treatment. Lungs with diffuse wheezes noted. Hospitalist consulted for admission.          Amount and/or Complexity of Data Reviewed  Clinical lab tests: reviewed  Tests in the medicine section of CPT®: reviewed  Discuss the patient with other providers: yes  Independent visualization of images, tracings, or specimens: yes    Risk of Complications, Morbidity, and/or Mortality  Presenting problems: moderate  Diagnostic procedures: low  Management options: low    Patient Progress  Patient progress: stable        ED Course       EKG  Date/Time: 7/18/2018 1:24 PM  Performed by: Marlon Casey, [address] [place] [address]  Authorized by: Talisha Pérez     ECG reviewed by ED Physician in the absence of a cardiologist: yes    Rate:     ECG rate:  45    ECG rate assessment: bradycardic    Rhythm:     Rhythm: sinus bradycardia Ectopy:     Ectopy: none    QRS:     QRS axis:  Normal    QRS intervals:  Normal  Conduction:     Conduction: normal    ST segments:     ST segments:  Normal  T waves:     T waves: normal        Results Include:    Recent Results (from the past 24 hour(s))   EKG, 12 LEAD, INITIAL    Collection Time: 07/18/18  1:16 PM   Result Value Ref Range    Ventricular Rate 45 BPM    Atrial Rate 45 BPM    P-R Interval 130 ms    QRS Duration 92 ms    Q-T Interval 494 ms    QTC Calculation (Bezet) 427 ms    Calculated P Axis 58 degrees    Calculated R Axis 63 degrees    Calculated T Axis 91 degrees    Diagnosis       Sinus bradycardia  Nonspecific ST and T wave abnormality  Abnormal ECG  When compared with ECG of 09-MAR-2018 19:56,  No significant change was found     POC LACTIC ACID    Collection Time: 07/18/18  1:31 PM   Result Value Ref Range    Lactic Acid (POC) 3.4 (H) 0.5 - 1.9 mmol/L   BLOOD GAS, ARTERIAL    Collection Time: 07/18/18  2:20 PM   Result Value Ref Range    pH 7.37 7.35 - 7.45      PCO2 33 (L) 35 - 45 mmHg    PO2 69 (L) 80 - 105 mmHg    BICARBONATE 19 (L) 22 - 26 mmol/L    BASE DEFICIT 5.5 (H) 0 - 2 mmol/L    TOTAL HEMOGLOBIN 13.7 11.7 - 15.0 GM/DL    O2 SAT 93 92 - 98.5 %    Arterial O2 Hgb 91.1 (L) 94 - 97 %    CARBOXYHEMOGLOBIN 2.0 (H) 0.5 - 1.5 %    METHEMOGLOBIN 0.4 0.0 - 1.5 %    DEOXYHEMOGLOBIN 7 (H) 0.0 - 5.0 %    SITE RR     ALLENS TEST POSITIVE           Josemanuel Lemus MD; 7/18/2018 @1:08 PM Voice dictation software was used during the making of this note. This software is not perfect and grammatical and other typographical errors may be present.   This note has not been proofread for errors.  ===================================================================

## 2018-07-18 NOTE — IP AVS SNAPSHOT
[address] 
[phone] Patient: Phuong Damon MRN: [id number] :1970 About your hospitalization You were admitted on:  2018 You last received care in the:  [facility] Yomi Shultz You were discharged on:  2018 Why you were hospitalized Your primary diagnosis was:  Acute Respiratory Failure With Hypoxemia (Hcc) Your diagnoses also included:  Acute Exacerbation Of Chronic Obstructive Pulmonary Disease (Copd) (Hcc), Tobacco Abuse, Lactic Acidosis, Leucocytosis, Acute Bronchiolitis With Bronchospasm, Left Breast Lump, Atypical Pneumonia, Sepsis (Hcc) Follow-up Information Follow up With Details Comments Contact Info Not On File Bshsi  please call your primary care doctor on Monday and schedule a follow up appt for a week out  Not On File (58) Patient has a PCP but that physician is not listed in [facility]. Discharge Orders None A check florinda indicates which time of day the medication should be taken. My Medications START taking these medications Instructions Each Dose to Equal  
 Morning Noon Evening Bedtime ALPRAZolam 0.25 mg tablet Commonly known as:  Melnellie Raphael Your next dose is: Take on as needed schedule Take 1 Tab by mouth two (2) times daily as needed for Anxiety. Max Daily Amount: 0.5 mg. Do not use this medication together with tramadol. Allow a 2 hr window 0.25 mg  
    
   
   
   
  
 benzonatate 100 mg capsule Commonly known as:  TESSALON Your next dose is: Take on as needed schedule Take 1 Cap by mouth three (3) times daily as needed for Cough for up to 5 days. 100 mg  
    
   
   
   
  
 guaiFENesin  mg ER tablet Commonly known as:  Ranjeet & Ranjeet Take 1 Tab by mouth every twelve (12) hours for 7 days.   
 600 mg  
    
   
   
  
   
  
 guaiFENesin-dextromethorphan  mg/5 mL Liqd Commonly known as:  TUSSI-ORGANIDIN DM Your next dose is: Take on as needed schedule Take 10 mL by mouth every six (6) hours as needed. 10 mL  
    
   
   
   
  
 inhalational spacing device Your next dose is: Take on as needed schedule 1 Each by Does Not Apply route as needed. 1 Each  
    
   
   
   
  
 levoFLOXacin 750 mg tablet Commonly known as:  Raven Rakes Take 1 Tab by mouth every twenty-four (24) hours for 3 days. 750 mg  
    
   
   
  
   
  
 nicotine 21 mg/24 hr  
Commonly known as:  Barnie Sartorius Start taking on:  7/23/2018 1 Patch by TransDERmal route daily for 30 days. 1 Patch  
    
  
   
   
   
  
 pantoprazole 40 mg tablet Commonly known as:  PROTONIX Start taking on:  7/23/2018 Take 1 Tab by mouth Daily (before breakfast) for 10 days. 40 mg  
    
  
   
   
   
  
 predniSONE 20 mg tablet Commonly known as:  Cleveland Welch Your next dose is: Take as directed Take 2 tablets every day before breakfast for 3 days, then Take 1 tablet every day BB for 3 days, then Take half a tablet every day BB for 3 days, then stop  
     
   
   
   
  
 traMADol 50 mg tablet Commonly known as:  ULTRAM  
Your next dose is: Take on as needed schedule Take 1 Tab by mouth every six (6) hours as needed. Max Daily Amount: 200 mg. Do not use this medication together with xanax. Allow a 2 hour window 50 mg CHANGE how you take these medications Instructions Each Dose to Equal  
 Morning Noon Evening Bedtime  
 albuterol 90 mcg/actuation inhaler Commonly known as:  PROAIR HFA What changed:   
- how much to take - when to take this 
- reasons to take this Your next dose is: Take on as needed schedule Take 2 Puffs by inhalation every six (6) hours as needed for Wheezing. 2 Puff CONTINUE taking these medications Instructions Each Dose to Equal  
 Morning Noon Evening Bedtime  
 famotidine 20 mg tablet Commonly known as:  PEPCID Take 1 Tab by mouth two (2) times a day. 20 mg  
    
   
   
  
   
  
 fluticasone 50 mcg/actuation nasal spray Commonly known as:  Jovan Robert Your next dose is: Take as directed 2 Sprays by Both Nostrils route daily. 2 Spray  
    
   
   
   
  
 fluticasone-vilanterol 200-25 mcg/dose inhaler Commonly known as:  WOODROW ELLIPTA Your next dose is: Take as directed Take 1 Puff by inhalation daily. RINSE MOUTH WELL AFTER USE  
 1 Puff  
    
   
   
   
  
 loratadine 10 mg tablet Commonly known as:  Michaell Organ Take 1 Tab by mouth daily. 10 mg Where to Get Your Medications Information on where to get these meds will be given to you by the nurse or doctor. ! Ask your nurse or doctor about these medications  
  albuterol 90 mcg/actuation inhaler ALPRAZolam 0.25 mg tablet  
 benzonatate 100 mg capsule  
 guaiFENesin  mg ER tablet  
 guaiFENesin-dextromethorphan  mg/5 mL Liqd  
 inhalational spacing device  
 levoFLOXacin 750 mg tablet  
 nicotine 21 mg/24 hr  
 pantoprazole 40 mg tablet  
 predniSONE 20 mg tablet  
 traMADol 50 mg tablet Opioid Education Prescription Opioids: What You Need to Know: 
 
 
After general anesthesia or intravenous sedation, for 24 hours or while taking prescription Narcotics: · Limit your activities · Do not drive and operate hazardous machinery · Do not make important personal or business decisions · Do  not drink alcoholic beverages · If you have not urinated within 8 hours after discharge, please contact your surgeon on call. Report the following to your surgeon: 
· Excessive pain, swelling, redness or odor of or around the surgical area · Temperature over 100.5 · Nausea and vomiting lasting longer than 4 hours or if unable to take medications · Any signs of decreased circulation or nerve impairment to extremity: change in color, persistent  numbness, tingling, coldness or increase pain · Any questions What to do at Home: 
Recommended activity: Activity as tolerated If you experience any of the following symptoms shortness of breath not relieved by rest, pain not relieved by medication, chest pain or pressure, temperature greater than 101, nausea, vomiting, diarrhea, or increase in weakness fatigue or swelling please follow up with MD. 
 
*  Please give a list of your current medications to your Primary Care Provider. *  Please update this list whenever your medications are discontinued, doses are 
    changed, or new medications (including over-the-counter products) are added. *  Please carry medication information at all times in case of emergency situations. These are general instructions for a healthy lifestyle: No smoking/ No tobacco products/ Avoid exposure to second hand smoke Surgeon General's Warning:  Quitting smoking now greatly reduces serious risk to your health. Obesity, smoking, and sedentary lifestyle greatly increases your risk for illness A healthy diet, regular physical exercise & weight monitoring are important for maintaining a healthy lifestyle You may be retaining fluid if you have a history of heart failure or if you experience any of the following symptoms:  Weight gain of 3 pounds or more overnight or 5 pounds in a week, increased swelling in our hands or feet or shortness of breath while lying flat in bed.   Please call your doctor as soon as you notice any of these symptoms; do not wait until your next office visit. Recognize signs and symptoms of STROKE: 
 
F-face looks uneven A-arms unable to move or move unevenly S-speech slurred or non-existent T-time-call 911 as soon as signs and symptoms begin-DO NOT go Back to bed or wait to see if you get better-TIME IS BRAIN. Warning Signs of HEART ATTACK Call 911 if you have these symptoms: 
? Chest discomfort. Most heart attacks involve discomfort in the center of the chest that lasts more than a few minutes, or that goes away and comes back. It can feel like uncomfortable pressure, squeezing, fullness, or pain. ? Discomfort in other areas of the upper body. Symptoms can include pain or discomfort in one or both arms, the back, neck, jaw, or stomach. ? Shortness of breath with or without chest discomfort. ? Other signs may include breaking out in a cold sweat, nausea, or lightheadedness. Don't wait more than five minutes to call [address]! Fast action can save your life. Calling 911 is almost always the fastest way to get lifesaving treatment. Emergency Medical Services staff can begin treatment when they arrive  up to an hour sooner than if someone gets to the hospital by car. The discharge information has been reviewed with the patient and spouse. The patient and spouse verbalized understanding. Discharge medications reviewed with the patient and spouse and appropriate educational materials and side effects teaching were provided. ___________________________________________________________________________________________________________________________________ Shortness of Breath: Care Instructions Your Care Instructions Shortness of breath has many causes. Sometimes conditions such as anxiety can lead to shortness of breath.  Some people get mild shortness of breath when they exercise. Trouble breathing also can be a symptom of a serious problem, such as asthma, lung disease, emphysema, heart problems, and pneumonia. If your shortness of breath continues, you may need tests and treatment. Watch for any changes in your breathing and other symptoms. Follow-up care is a key part of your treatment and safety. Be sure to make and go to all appointments, and call your doctor if you are having problems. It's also a good idea to know your test results and keep a list of the medicines you take. How can you care for yourself at home? · Do not smoke or allow others to smoke around you. If you need help quitting, talk to your doctor about stop-smoking programs and medicines. These can increase your chances of quitting for good. · Get plenty of rest and sleep. · Take your medicines exactly as prescribed. Call your doctor if you think you are having a problem with your medicine. · Find healthy ways to deal with stress. ¨ Exercise daily. ¨ Get plenty of sleep. ¨ Eat regularly and well. When should you call for help? Call 911 anytime you think you may need emergency care. For example, call if: 
  · You have severe shortness of breath.  
  · You have symptoms of a heart attack. These may include: ¨ Chest pain or pressure, or a strange feeling in the chest. 
¨ Sweating. ¨ Shortness of breath. ¨ Nausea or vomiting. ¨ Pain, pressure, or a strange feeling in the back, neck, jaw, or upper belly or in one or both shoulders or arms. ¨ Lightheadedness or sudden weakness. ¨ A fast or irregular heartbeat. After you call 911, the  may tell you to chew 1 adult-strength or 2 to 4 low-dose aspirin. Wait for an ambulance. Do not try to drive yourself.  
 Call your doctor now or seek immediate medical care if: 
  · Your shortness of breath gets worse or you start to wheeze. Wheezing is a high-pitched sound when you breathe.   · You wake up at night out of breath or have to prop your head up on several pillows to breathe.  
  · You are short of breath after only light activity or while at rest.  
 Watch closely for changes in your health, and be sure to contact your doctor if: 
  · You do not get better over the next 1 to 2 days. Where can you learn more? Go to http://jayna-scooter.info/. Enter S780 in the search box to learn more about \"Shortness of Breath: Care Instructions. \" Current as of: December 6, 2017 Content Version: 11.7 © 4312-4314 [employer]. Care instructions adapted under license by [employer] (which disclaims liability or warranty for this information). If you have questions about a medical condition or this instruction, always ask your healthcare professional. [address] any warranty or liability for your use of this information. Introducing [facility]! [employer] introduces [facility] patient portal. Now you can access parts of your medical record, email your doctor's office, and request medication refills online. 1. In your internet browser, go to [email or web address] 2. Click on the First Time User? Click Here link in the Sign In box. You will see the New Member Sign Up page. 3. Enter your [facility] Access Code exactly as it appears below. You will not need to use this code after youve completed the sign-up process. If you do not sign up before the expiration date, you must request a new code. · [facility] Access Code: [email or web address] Expires: 10/16/2018 11:28 AM 
 
4. Enter the last four digits of your Social Security Number (xxxx) and Date of Birth (mm/dd/yyyy) as indicated and click Submit. You will be taken to the next sign-up page. 5. Create a [facility] ID. This will be your [facility] login ID and cannot be changed, so think of one that is secure and easy to remember. 6. Create a [employer] password. You can change your password at any time. 7. Enter your Password Reset Question and Answer. This can be used at a later time if you forget your password. 8. Enter your e-mail address. You will receive e-mail notification when new information is available in [address] 9. Click Sign Up. You can now view and download portions of your medical record. 10. Click the Download Summary menu link to download a portable copy of your medical information. If you have questions, please visit the Frequently Asked Questions section of the [employer] website. Remember, [employer] is NOT to be used for urgent needs. For medical emergencies, dial 911. Now available from your iPhone and Android! Introducing Abhinav Fernandez As a [facility] patient, I wanted to make you aware of our electronic visit tool called Abhinav Fernandez. [employer] allows you to connect within minutes with a medical provider 24 hours a day, seven days a week via a mobile device or tablet or logging into a secure website from your computer. You can access Abhinav Fernandez from anywhere in the [place]. A virtual visit might be right for you when you have a simple condition and feel like you just dont want to get out of bed, or cant get away from work for an appointment, when your regular [facility] provider is not available (evenings, weekends or holidays), or when youre out of town and need minor care. Electronic visits cost only $49 and if the [employer] provider determines a prescription is needed to treat your condition, one can be electronically transmitted to a nearby pharmacy*. Please take a moment to enroll today if you have not already done so. The enrollment process is free and takes just a few minutes. To enroll, please download the DATY hazel to your tablet or phone, or visit [email or web address] to enroll on your computer. And, as an [address] [place] [address] patient with a [employer] account, the results of your visits will be scanned into your electronic medical record and your primary care provider will be able to view the scanned results. We urge you to continue to see your regular [facility] provider for your ongoing medical care. And while your primary care provider may not be the one available when you seek a Abhinav Rustfin virtual visit, the peace of mind you get from getting a real diagnosis real time can be priceless. For more information on Abhinav Rustfin, view our Frequently Asked Questions (FAQs) at [email or web address]. Sincerely, 
 
Santy Barrera MD 
Chief Medical Officer Jacquelyn Cevallos *:  certain medications cannot be prescribed via AUPEO! Unresulted Labs-Please follow up with your PCP about these lab tests Order Current Status LEGIONELLA PNEUMOPHILA AG, URINE In process MYCOPLASMA AB, IGG/IGM In process MYCOPLASMA AB, IGG/IGM In process CULTURE, BLOOD Preliminary result CULTURE, BLOOD Preliminary result Providers Seen During Your Hospitalization Provider Specialty Primary office phone [facility], MD Emergency Medicine [phone] Anne Simmons MD Internal Medicine [phone] Your Primary Care Physician (PCP) Primary Care Physician Office Phone Office Fax NOT ON FILE ** None ** ** None ** You are allergic to the following Allergen Reactions Iodinated Contrast- Oral And Iv Dye Hives Keflex (Cephalexin) Hives Recent Documentation Height Weight Breastfeeding? BMI OB Status Smoking Status 1.702 m 79.4 kg No 27.42 kg/m2 Menopause Current Every Day Smoker Emergency Contacts Name Discharge Info Relation Home Work Mobile Janay Ann [5] [phone] Patient Belongings The following personal items are in your possession at time of discharge: 
  Dental Appliances: None  Visual Aid: None      Home Medications: None   Jewelry: Ring, With patient  Clothing: Shirt, Pants    Other Valuables: None Please provide this summary of care documentation to your next provider. Signatures-by signing, you are acknowledging that this After Visit Summary has been reviewed with you and you have received a copy. Patient Signature:  ____________________________________________________________ Date:  ____________________________________________________________  
  
Rustam Police Provider Signature:  ____________________________________________________________ Date:  ____________________________________________________________

## 2018-07-19 ENCOUNTER — APPOINTMENT (OUTPATIENT)
Dept: GENERAL RADIOLOGY | Age: 48
DRG: 190 | End: 2018-07-19
Attending: INTERNAL MEDICINE
Payer: COMMERCIAL

## 2018-07-19 PROBLEM — A41.9 SEPSIS (HCC): Status: ACTIVE | Noted: 2018-07-19

## 2018-07-19 PROBLEM — J18.9 ATYPICAL PNEUMONIA: Status: ACTIVE | Noted: 2018-07-19

## 2018-07-19 LAB
ANION GAP SERPL CALC-SCNC: 9 MMOL/L (ref 7–16)
ANION GAP SERPL CALC-SCNC: 9 MMOL/L (ref 7–16)
ARTERIAL PATENCY WRIST A: ABNORMAL
BASE DEFICIT BLDA-SCNC: 4.1 MMOL/L (ref 0–2)
BDY SITE: ABNORMAL
BNP SERPL-MCNC: 261 PG/ML
BNP SERPL-MCNC: 292 PG/ML
BUN SERPL-MCNC: 10 MG/DL (ref 6–23)
BUN SERPL-MCNC: 10 MG/DL (ref 6–23)
CALCIUM SERPL-MCNC: 7.9 MG/DL (ref 8.3–10.4)
CALCIUM SERPL-MCNC: 8.2 MG/DL (ref 8.3–10.4)
CHLORIDE SERPL-SCNC: 110 MMOL/L (ref 98–107)
CHLORIDE SERPL-SCNC: 112 MMOL/L (ref 98–107)
CO2 SERPL-SCNC: 21 MMOL/L (ref 21–32)
CO2 SERPL-SCNC: 24 MMOL/L (ref 21–32)
COHGB MFR BLD: 0.5 % (ref 0.5–1.5)
CREAT SERPL-MCNC: 0.91 MG/DL (ref 0.6–1)
CREAT SERPL-MCNC: 0.92 MG/DL (ref 0.6–1)
DO-HGB BLD-MCNC: 4 % (ref 0–5)
ERYTHROCYTE [DISTWIDTH] IN BLOOD BY AUTOMATED COUNT: 14 % (ref 11.9–14.6)
ERYTHROCYTE [DISTWIDTH] IN BLOOD BY AUTOMATED COUNT: 14.1 % (ref 11.9–14.6)
GLUCOSE SERPL-MCNC: 201 MG/DL (ref 65–100)
GLUCOSE SERPL-MCNC: 229 MG/DL (ref 65–100)
HCO3 BLDA-SCNC: 20 MMOL/L (ref 22–26)
HCT VFR BLD AUTO: 31.4 % (ref 35.8–46.3)
HCT VFR BLD AUTO: 32.7 % (ref 35.8–46.3)
HGB BLD-MCNC: 10.4 G/DL (ref 11.7–15.4)
HGB BLD-MCNC: 10.8 G/DL (ref 11.7–15.4)
HGB BLDMV-MCNC: 10.8 GM/DL (ref 11.7–15)
LACTATE SERPL-SCNC: 3.2 MMOL/L (ref 0.4–2)
LACTATE SERPL-SCNC: 3.5 MMOL/L (ref 0.4–2)
LACTATE SERPL-SCNC: 4.4 MMOL/L (ref 0.4–2)
LACTATE SERPL-SCNC: 4.4 MMOL/L (ref 0.4–2)
MAGNESIUM SERPL-MCNC: 1.7 MG/DL (ref 1.8–2.4)
MCH RBC QN AUTO: 31.5 PG (ref 26.1–32.9)
MCH RBC QN AUTO: 31.5 PG (ref 26.1–32.9)
MCHC RBC AUTO-ENTMCNC: 33 G/DL (ref 31.4–35)
MCHC RBC AUTO-ENTMCNC: 33.1 G/DL (ref 31.4–35)
MCV RBC AUTO: 95.2 FL (ref 79.6–97.8)
MCV RBC AUTO: 95.3 FL (ref 79.6–97.8)
METHGB MFR BLD: 0.1 % (ref 0–1.5)
OXYHGB MFR BLDA: 95.8 % (ref 94–97)
PCO2 BLDA: 32 MMHG (ref 35–45)
PH BLDA: 7.41 [PH] (ref 7.35–7.45)
PLATELET # BLD AUTO: 277 K/UL (ref 150–450)
PLATELET # BLD AUTO: 286 K/UL (ref 150–450)
PMV BLD AUTO: 10.5 FL (ref 10.8–14.1)
PMV BLD AUTO: 10.7 FL (ref 10.8–14.1)
PO2 BLDA: 82 MMHG (ref 80–105)
POTASSIUM SERPL-SCNC: 3.5 MMOL/L (ref 3.5–5.1)
POTASSIUM SERPL-SCNC: 3.5 MMOL/L (ref 3.5–5.1)
RBC # BLD AUTO: 3.3 M/UL (ref 4.05–5.25)
RBC # BLD AUTO: 3.43 M/UL (ref 4.05–5.25)
SAO2 % BLD: 96 % (ref 92–98.5)
SODIUM SERPL-SCNC: 142 MMOL/L (ref 136–145)
SODIUM SERPL-SCNC: 143 MMOL/L (ref 136–145)
WBC # BLD AUTO: 16.3 K/UL (ref 4.3–11.1)
WBC # BLD AUTO: 20.6 K/UL (ref 4.3–11.1)

## 2018-07-19 PROCEDURE — 82803 BLOOD GASES ANY COMBINATION: CPT

## 2018-07-19 PROCEDURE — 74011250636 HC RX REV CODE- 250/636: Performed by: INTERNAL MEDICINE

## 2018-07-19 PROCEDURE — 71045 X-RAY EXAM CHEST 1 VIEW: CPT

## 2018-07-19 PROCEDURE — 94640 AIRWAY INHALATION TREATMENT: CPT

## 2018-07-19 PROCEDURE — 36600 WITHDRAWAL OF ARTERIAL BLOOD: CPT

## 2018-07-19 PROCEDURE — 83605 ASSAY OF LACTIC ACID: CPT | Performed by: INTERNAL MEDICINE

## 2018-07-19 PROCEDURE — 85027 COMPLETE CBC AUTOMATED: CPT | Performed by: INTERNAL MEDICINE

## 2018-07-19 PROCEDURE — 74011000250 HC RX REV CODE- 250: Performed by: INTERNAL MEDICINE

## 2018-07-19 PROCEDURE — 86738 MYCOPLASMA ANTIBODY: CPT | Performed by: INTERNAL MEDICINE

## 2018-07-19 PROCEDURE — 74011250637 HC RX REV CODE- 250/637: Performed by: INTERNAL MEDICINE

## 2018-07-19 PROCEDURE — 83880 ASSAY OF NATRIURETIC PEPTIDE: CPT | Performed by: INTERNAL MEDICINE

## 2018-07-19 PROCEDURE — 71046 X-RAY EXAM CHEST 2 VIEWS: CPT

## 2018-07-19 PROCEDURE — 77030020263 HC SOL INJ SOD CL0.9% LFCR 1000ML

## 2018-07-19 PROCEDURE — 74011000258 HC RX REV CODE- 258: Performed by: INTERNAL MEDICINE

## 2018-07-19 PROCEDURE — 87449 NOS EACH ORGANISM AG IA: CPT | Performed by: INTERNAL MEDICINE

## 2018-07-19 PROCEDURE — 94760 N-INVAS EAR/PLS OXIMETRY 1: CPT

## 2018-07-19 PROCEDURE — 80048 BASIC METABOLIC PNL TOTAL CA: CPT | Performed by: INTERNAL MEDICINE

## 2018-07-19 PROCEDURE — 83735 ASSAY OF MAGNESIUM: CPT | Performed by: INTERNAL MEDICINE

## 2018-07-19 PROCEDURE — 77010033678 HC OXYGEN DAILY

## 2018-07-19 PROCEDURE — 65270000029 HC RM PRIVATE

## 2018-07-19 RX ORDER — GUAIFENESIN 600 MG/1
600 TABLET, EXTENDED RELEASE ORAL EVERY 12 HOURS
Status: DISCONTINUED | OUTPATIENT
Start: 2018-07-19 | End: 2018-07-22 | Stop reason: HOSPADM

## 2018-07-19 RX ORDER — LEVOFLOXACIN 500 MG/1
500 TABLET, FILM COATED ORAL EVERY 24 HOURS
Status: DISCONTINUED | OUTPATIENT
Start: 2018-07-19 | End: 2018-07-19

## 2018-07-19 RX ORDER — BENZONATATE 100 MG/1
100 CAPSULE ORAL
Status: DISCONTINUED | OUTPATIENT
Start: 2018-07-19 | End: 2018-07-22 | Stop reason: HOSPADM

## 2018-07-19 RX ORDER — OXYCODONE HYDROCHLORIDE 5 MG/1
2.5 TABLET ORAL
Status: DISCONTINUED | OUTPATIENT
Start: 2018-07-19 | End: 2018-07-22 | Stop reason: HOSPADM

## 2018-07-19 RX ORDER — LEVALBUTEROL INHALATION SOLUTION 1.25 MG/3ML
1.25 SOLUTION RESPIRATORY (INHALATION)
Status: DISCONTINUED | OUTPATIENT
Start: 2018-07-19 | End: 2018-07-19

## 2018-07-19 RX ORDER — ALBUTEROL SULFATE 0.83 MG/ML
2.5 SOLUTION RESPIRATORY (INHALATION)
Status: DISCONTINUED | OUTPATIENT
Start: 2018-07-19 | End: 2018-07-19

## 2018-07-19 RX ORDER — IPRATROPIUM BROMIDE AND ALBUTEROL SULFATE 2.5; .5 MG/3ML; MG/3ML
3 SOLUTION RESPIRATORY (INHALATION)
Status: DISCONTINUED | OUTPATIENT
Start: 2018-07-19 | End: 2018-07-19

## 2018-07-19 RX ORDER — MAGNESIUM SULFATE HEPTAHYDRATE 40 MG/ML
2 INJECTION, SOLUTION INTRAVENOUS ONCE
Status: COMPLETED | OUTPATIENT
Start: 2018-07-19 | End: 2018-07-19

## 2018-07-19 RX ORDER — LEVALBUTEROL INHALATION SOLUTION 1.25 MG/3ML
1.25 SOLUTION RESPIRATORY (INHALATION)
Status: DISCONTINUED | OUTPATIENT
Start: 2018-07-19 | End: 2018-07-22 | Stop reason: HOSPADM

## 2018-07-19 RX ADMIN — PANTOPRAZOLE SODIUM 40 MG: 40 TABLET, DELAYED RELEASE ORAL at 06:23

## 2018-07-19 RX ADMIN — BUDESONIDE 500 MCG: 0.5 INHALANT RESPIRATORY (INHALATION) at 08:03

## 2018-07-19 RX ADMIN — Medication 10 ML: at 06:22

## 2018-07-19 RX ADMIN — OXYCODONE HYDROCHLORIDE 2.5 MG: 5 TABLET ORAL at 11:31

## 2018-07-19 RX ADMIN — ACETAMINOPHEN 650 MG: 325 TABLET ORAL at 14:01

## 2018-07-19 RX ADMIN — LEVALBUTEROL HYDROCHLORIDE 1.25 MG: 1.25 SOLUTION RESPIRATORY (INHALATION) at 20:25

## 2018-07-19 RX ADMIN — LEVOFLOXACIN 750 MG: 500 TABLET, FILM COATED ORAL at 17:17

## 2018-07-19 RX ADMIN — LEVALBUTEROL HYDROCHLORIDE 1.25 MG: 1.25 SOLUTION RESPIRATORY (INHALATION) at 23:35

## 2018-07-19 RX ADMIN — ENOXAPARIN SODIUM 40 MG: 40 INJECTION, SOLUTION INTRAVENOUS; SUBCUTANEOUS at 16:16

## 2018-07-19 RX ADMIN — FAMOTIDINE 20 MG: 20 TABLET ORAL at 16:16

## 2018-07-19 RX ADMIN — SODIUM CHLORIDE 100 ML/HR: 900 INJECTION, SOLUTION INTRAVENOUS at 20:55

## 2018-07-19 RX ADMIN — IPRATROPIUM BROMIDE AND ALBUTEROL SULFATE 3 ML: .5; 3 SOLUTION RESPIRATORY (INHALATION) at 18:58

## 2018-07-19 RX ADMIN — IPRATROPIUM BROMIDE AND ALBUTEROL SULFATE 3 ML: .5; 3 SOLUTION RESPIRATORY (INHALATION) at 03:15

## 2018-07-19 RX ADMIN — LORATADINE 10 MG: 10 TABLET ORAL at 07:30

## 2018-07-19 RX ADMIN — SODIUM CHLORIDE 1000 ML: 900 INJECTION, SOLUTION INTRAVENOUS at 18:33

## 2018-07-19 RX ADMIN — MAGNESIUM SULFATE HEPTAHYDRATE 2 G: 40 INJECTION, SOLUTION INTRAVENOUS at 13:57

## 2018-07-19 RX ADMIN — BENZONATATE 100 MG: 100 CAPSULE ORAL at 08:45

## 2018-07-19 RX ADMIN — IPRATROPIUM BROMIDE AND ALBUTEROL SULFATE 3 ML: .5; 3 SOLUTION RESPIRATORY (INHALATION) at 08:03

## 2018-07-19 RX ADMIN — BENZONATATE 100 MG: 100 CAPSULE ORAL at 17:21

## 2018-07-19 RX ADMIN — HYDROCODONE BITARTRATE AND ACETAMINOPHEN 10 MG: 7.5; 325 SOLUTION ORAL at 07:30

## 2018-07-19 RX ADMIN — GUAIFENESIN 600 MG: 600 TABLET, EXTENDED RELEASE ORAL at 20:55

## 2018-07-19 RX ADMIN — PIPERACILLIN SODIUM AND TAZOBACTAM SODIUM 3.38 G: 3; .375 INJECTION, POWDER, LYOPHILIZED, FOR SOLUTION INTRAVENOUS at 20:55

## 2018-07-19 RX ADMIN — METHYLPREDNISOLONE SODIUM SUCCINATE 40 MG: 40 INJECTION, POWDER, FOR SOLUTION INTRAMUSCULAR; INTRAVENOUS at 13:56

## 2018-07-19 RX ADMIN — BUDESONIDE 500 MCG: 0.5 INHALANT RESPIRATORY (INHALATION) at 20:25

## 2018-07-19 RX ADMIN — FAMOTIDINE 20 MG: 20 TABLET ORAL at 07:29

## 2018-07-19 RX ADMIN — SODIUM CHLORIDE 1000 ML: 900 INJECTION, SOLUTION INTRAVENOUS at 09:21

## 2018-07-19 RX ADMIN — ALBUTEROL SULFATE 2.5 MG: 2.5 SOLUTION RESPIRATORY (INHALATION) at 16:24

## 2018-07-19 RX ADMIN — Medication 10 ML: at 20:56

## 2018-07-19 RX ADMIN — ALBUTEROL SULFATE 2.5 MG: 2.5 SOLUTION RESPIRATORY (INHALATION) at 11:20

## 2018-07-19 RX ADMIN — Medication 10 ML: at 06:21

## 2018-07-19 RX ADMIN — SODIUM CHLORIDE 100 ML/HR: 900 INJECTION, SOLUTION INTRAVENOUS at 09:22

## 2018-07-19 RX ADMIN — METHYLPREDNISOLONE SODIUM SUCCINATE 40 MG: 40 INJECTION, POWDER, FOR SOLUTION INTRAMUSCULAR; INTRAVENOUS at 20:56

## 2018-07-19 RX ADMIN — METHYLPREDNISOLONE SODIUM SUCCINATE 40 MG: 40 INJECTION, POWDER, FOR SOLUTION INTRAMUSCULAR; INTRAVENOUS at 06:21

## 2018-07-19 NOTE — PROGRESS NOTES
Spiritual Care Visit, initial visit. Visited with patient at bedside. Prayed for patient's healing and health. Visit by Fredy Francisco, Staff .  Pratibha., Th.B., B.A.

## 2018-07-19 NOTE — PROGRESS NOTES
Pt resting in bed. Pt reports headache is a little better no distress noted at this time. Call light in reach, door open will monitor.

## 2018-07-19 NOTE — PROGRESS NOTES
Patient sitting up in bed, O2 not in place, patient denies SOB, O2 sat @ 97%, O2 left off and placed @ side, encouraged to use prn for SOB, an to notify nurse if she feels SOB. Call light within reach, RT notified.

## 2018-07-19 NOTE — PROGRESS NOTES
Progress Note Patient: Juancho Bhandari MRN: [id number]  SSN: [id number] YOB: 1970  Age: 46 y.o. Sex: female Admit Date: 7/18/2018 LOS: 1 day Subjective: This is a 45 YO female patient with a PMH of tobacco use who was admitted yesterday after she failed outpatient treatment for possible bronchitis. She was found to have elevated WBC count, elevated lactic acid and her chest x ray is suggestive of atypical pneumonia. She is still very symptomatic with bilateral wheezing and rhonchi. Objective:  
 
Vitals:  
 07/19/18 0803 07/19/18 1116 07/19/18 1120 07/19/18 1443 BP:  109/68  111/68 Pulse:  62  68 Resp:  18  17 Temp:  97.6 °F (36.4 °C)  97.8 °F (36.6 °C) SpO2: 95% 98% 99% 96% Weight:      
Height:      
  
 
Intake and Output: 
Current Shift: 07/19 0701 - 07/19 1900 In: 3130 [P.O.:480; I.V.:2650] Out: 550 [Urine:550] Last three shifts: 07/17 1901 - 07/19 0700 In: 720 [P.O.:720] Out: - Physical Exam:  
GENERAL: alert, mild distress, appears stated age EYE: conjunctivae/corneas clear. LYMPHATIC: Cervical, supraclavicular, and axillary nodes normal.  
THROAT & NECK: normal and no erythema or exudates noted. LUNG: bilateral wheezing and rhonchi HEART: regular rate and rhythm, S1, S2 normal, no murmur, click, rub or gallop ABDOMEN: soft, non-tender. Bowel sounds normal. No masses,  no organomegaly EXTREMITIES:  extremities normal, atraumatic, no cyanosis or edema SKIN: Normal. 
NEUROLOGIC: no focal deficits. PSYCHIATRIC: non focal 
 
Lab/Data Review: 
Lab results reviewed. For significant abnormal values and values requiring intervention, see assessment and plan. Assessment:  
 
Principal Problem: 
  Acute respiratory failure with hypoxemia ([address] (7/18/2018) Active Problems: 
  Tobacco abuse (3/10/2018) Acute exacerbation of chronic obstructive pulmonary disease (COPD) ([address] (7/18/2018)   Lactic acidosis (7/18/2018) Leucocytosis (7/18/2018) Acute bronchiolitis with bronchospasm (7/18/2018) Left breast lump (7/18/2018) Plan:  
 
-continue IV fluids 
-monitor lactic acid 
-chest x ray suggestive of atypical pneumonia 
-continue levaquin 
-continue IV steroids 
-continue breathing therapies ] 
-monitor VS 
 
Signed By: Rochelle Bloom MD   
 July 19, 2018

## 2018-07-19 NOTE — PROGRESS NOTES
Patient rested quietly throughout night on RA, RR even and unlabored, IV fluids infusing, repeat lactic drawn this a.m, denies pain and discomfort, call light within reach.

## 2018-07-19 NOTE — PROGRESS NOTES
Patient reported to have worsening lactic acid. She also has worsening wheezing and rhonchi and worsening SOB. Will order another bolus of NS, switched to broad spectrum antibiotics with IV zosyn, stat duoneb ordered. Will repeat another chest x ray and will repeat another set of labs at 2100. Will follow clinical progress.

## 2018-07-19 NOTE — PROGRESS NOTES
MD notified of patient lactic 3.9, notified patient received bolus of NS @ beginning of shift as well as having fluids @ 150/hr, patient asymptomatic.

## 2018-07-19 NOTE — PHYSICIAN ADVISORY
Letter of Determination:  Outpatient status receiving Observation Services    This patient was originally hospitalized as Inpatient Status on 7/18/2018 for exacerbation of chronic obstructive pulmonary disease. At this time this patient does not appear to meet the medical necessity requirements to support an inpatient level of care. It is our recommendation that this patient's hospitalization status should be changed from INPATIENT to [place] receiving OBSERVATION services via Condition Code 44.      This may change due to the medical condition of the patient and new clinical evidence as the patients care progreses. The final decision regarding the patient's hospitalization status depends on the attending physician's judgement.     Angy Calabrese MD, SHRUTHI,   Physician [place].

## 2018-07-19 NOTE — PROGRESS NOTES
Pt resting in bed. Pt alert oriented times 3 at this time. Pt complaints of headache 8/10 at this time. No acute disress noted at this time. Pt encouraged to call for assistance if needed call light in reach, door open will monitor.

## 2018-07-19 NOTE — PROGRESS NOTES
Pt resting in bed. Pt reports feeling worse this afternoon. Pt complains of non productive cough. Pt complaints of SOB when coughing. Pt placed on 2 L NC for comfort. No pain noted at this time. Pt encouraged to call for assistance if needed call light in reach, door open will monitor.

## 2018-07-19 NOTE — PROGRESS NOTES
Dr. Kaden Olmedo made aware of lastest lactic acid , MD aware of pt complaints of feeling bad this afternoon, will monitor.

## 2018-07-19 NOTE — PROGRESS NOTES
Care Management Interventions  PCP Verified by CM: Yes (Dr. Evelyn Whipple)  Mode of Transport at Discharge: Other (see comment) (Daughter)  Transition of Care Consult (CM Consult): Discharge Planning  Discharge Durable Medical Equipment: No  Physical Therapy Consult: No  Occupational Therapy Consult: No  Speech Therapy Consult: No  Current Support Network: Own Home (Patient states her daughter lives with her)  Confirm Follow Up Transport: Family (Daughter)  Plan discussed with Pt/Family/Caregiver: Yes  Freedom of Choice Offered: Yes   Resource Information Provided?: No  Discharge Location  Discharge Placement: Home:  Patient is currently from home and independent with ADL's and have no DME's. Patient have no needs identified at this time.

## 2018-07-20 LAB
ANION GAP SERPL CALC-SCNC: 9 MMOL/L (ref 7–16)
BUN SERPL-MCNC: 12 MG/DL (ref 6–23)
CALCIUM SERPL-MCNC: 8.4 MG/DL (ref 8.3–10.4)
CHLORIDE SERPL-SCNC: 110 MMOL/L (ref 98–107)
CO2 SERPL-SCNC: 23 MMOL/L (ref 21–32)
CREAT SERPL-MCNC: 0.89 MG/DL (ref 0.6–1)
ERYTHROCYTE [DISTWIDTH] IN BLOOD BY AUTOMATED COUNT: 14.3 % (ref 11.9–14.6)
GLUCOSE SERPL-MCNC: 144 MG/DL (ref 65–100)
HCT VFR BLD AUTO: 34.2 % (ref 35.8–46.3)
HGB BLD-MCNC: 11.2 G/DL (ref 11.7–15.4)
LACTATE SERPL-SCNC: 2.9 MMOL/L (ref 0.4–2)
LACTATE SERPL-SCNC: 3.1 MMOL/L (ref 0.4–2)
LACTATE SERPL-SCNC: 3.1 MMOL/L (ref 0.4–2)
MCH RBC QN AUTO: 31.3 PG (ref 26.1–32.9)
MCHC RBC AUTO-ENTMCNC: 32.7 G/DL (ref 31.4–35)
MCV RBC AUTO: 95.5 FL (ref 79.6–97.8)
PLATELET # BLD AUTO: 302 K/UL (ref 150–450)
PMV BLD AUTO: 10.6 FL (ref 10.8–14.1)
POTASSIUM SERPL-SCNC: 3.7 MMOL/L (ref 3.5–5.1)
RBC # BLD AUTO: 3.58 M/UL (ref 4.05–5.25)
SODIUM SERPL-SCNC: 142 MMOL/L (ref 136–145)
WBC # BLD AUTO: 23.1 K/UL (ref 4.3–11.1)

## 2018-07-20 PROCEDURE — 94640 AIRWAY INHALATION TREATMENT: CPT

## 2018-07-20 PROCEDURE — 94760 N-INVAS EAR/PLS OXIMETRY 1: CPT

## 2018-07-20 PROCEDURE — 93005 ELECTROCARDIOGRAM TRACING: CPT | Performed by: INTERNAL MEDICINE

## 2018-07-20 PROCEDURE — 74011000258 HC RX REV CODE- 258: Performed by: INTERNAL MEDICINE

## 2018-07-20 PROCEDURE — 74011250637 HC RX REV CODE- 250/637: Performed by: INTERNAL MEDICINE

## 2018-07-20 PROCEDURE — 74011250636 HC RX REV CODE- 250/636: Performed by: INTERNAL MEDICINE

## 2018-07-20 PROCEDURE — 83605 ASSAY OF LACTIC ACID: CPT | Performed by: INTERNAL MEDICINE

## 2018-07-20 PROCEDURE — 85027 COMPLETE CBC AUTOMATED: CPT | Performed by: INTERNAL MEDICINE

## 2018-07-20 PROCEDURE — 80048 BASIC METABOLIC PNL TOTAL CA: CPT | Performed by: INTERNAL MEDICINE

## 2018-07-20 PROCEDURE — 77030020263 HC SOL INJ SOD CL0.9% LFCR 1000ML

## 2018-07-20 PROCEDURE — 74011000250 HC RX REV CODE- 250: Performed by: INTERNAL MEDICINE

## 2018-07-20 PROCEDURE — 99218 HC RM OBSERVATION: CPT

## 2018-07-20 PROCEDURE — 65270000029 HC RM PRIVATE

## 2018-07-20 PROCEDURE — 86738 MYCOPLASMA ANTIBODY: CPT | Performed by: INTERNAL MEDICINE

## 2018-07-20 PROCEDURE — 77010033678 HC OXYGEN DAILY

## 2018-07-20 RX ORDER — TRAMADOL HYDROCHLORIDE 50 MG/1
50 TABLET ORAL
Status: DISCONTINUED | OUTPATIENT
Start: 2018-07-20 | End: 2018-07-22 | Stop reason: HOSPADM

## 2018-07-20 RX ORDER — HYDROCODONE BITARTRATE AND HOMATROPINE METHYLBROMIDE 1.5; 5 MG/5ML; MG/5ML
5 SYRUP ORAL
Status: DISCONTINUED | OUTPATIENT
Start: 2018-07-20 | End: 2018-07-22 | Stop reason: HOSPADM

## 2018-07-20 RX ADMIN — LORATADINE 10 MG: 10 TABLET ORAL at 07:48

## 2018-07-20 RX ADMIN — ACETAMINOPHEN 650 MG: 325 TABLET ORAL at 12:27

## 2018-07-20 RX ADMIN — PANTOPRAZOLE SODIUM 40 MG: 40 TABLET, DELAYED RELEASE ORAL at 05:28

## 2018-07-20 RX ADMIN — BUDESONIDE 500 MCG: 0.5 INHALANT RESPIRATORY (INHALATION) at 19:12

## 2018-07-20 RX ADMIN — ACETAMINOPHEN 650 MG: 325 TABLET ORAL at 07:48

## 2018-07-20 RX ADMIN — HYDROCODONE BITARTRATE AND HOMATROPINE METHYLBROMIDE 5 ML: 5; 1.5 SOLUTION ORAL at 11:31

## 2018-07-20 RX ADMIN — LEVALBUTEROL HYDROCHLORIDE 1.25 MG: 1.25 SOLUTION RESPIRATORY (INHALATION) at 23:11

## 2018-07-20 RX ADMIN — Medication 10 ML: at 05:28

## 2018-07-20 RX ADMIN — TRAMADOL HYDROCHLORIDE 50 MG: 50 TABLET, FILM COATED ORAL at 13:23

## 2018-07-20 RX ADMIN — GUAIFENESIN 600 MG: 600 TABLET, EXTENDED RELEASE ORAL at 20:32

## 2018-07-20 RX ADMIN — BUDESONIDE 500 MCG: 0.5 INHALANT RESPIRATORY (INHALATION) at 07:32

## 2018-07-20 RX ADMIN — METHYLPREDNISOLONE SODIUM SUCCINATE 40 MG: 40 INJECTION, POWDER, FOR SOLUTION INTRAMUSCULAR; INTRAVENOUS at 12:58

## 2018-07-20 RX ADMIN — TRAMADOL HYDROCHLORIDE 50 MG: 50 TABLET, FILM COATED ORAL at 20:36

## 2018-07-20 RX ADMIN — METHYLPREDNISOLONE SODIUM SUCCINATE 40 MG: 40 INJECTION, POWDER, FOR SOLUTION INTRAMUSCULAR; INTRAVENOUS at 20:32

## 2018-07-20 RX ADMIN — LEVALBUTEROL HYDROCHLORIDE 1.25 MG: 1.25 SOLUTION RESPIRATORY (INHALATION) at 07:32

## 2018-07-20 RX ADMIN — METHYLPREDNISOLONE SODIUM SUCCINATE 40 MG: 40 INJECTION, POWDER, FOR SOLUTION INTRAMUSCULAR; INTRAVENOUS at 05:27

## 2018-07-20 RX ADMIN — PIPERACILLIN SODIUM AND TAZOBACTAM SODIUM 3.38 G: 3; .375 INJECTION, POWDER, LYOPHILIZED, FOR SOLUTION INTRAVENOUS at 05:27

## 2018-07-20 RX ADMIN — Medication 10 ML: at 11:36

## 2018-07-20 RX ADMIN — SODIUM CHLORIDE 500 ML: 900 INJECTION, SOLUTION INTRAVENOUS at 14:25

## 2018-07-20 RX ADMIN — Medication 10 ML: at 20:38

## 2018-07-20 RX ADMIN — GUAIFENESIN 600 MG: 600 TABLET, EXTENDED RELEASE ORAL at 07:48

## 2018-07-20 RX ADMIN — LEVALBUTEROL HYDROCHLORIDE 1.25 MG: 1.25 SOLUTION RESPIRATORY (INHALATION) at 11:38

## 2018-07-20 RX ADMIN — FAMOTIDINE 20 MG: 20 TABLET ORAL at 16:44

## 2018-07-20 RX ADMIN — LEVALBUTEROL HYDROCHLORIDE 1.25 MG: 1.25 SOLUTION RESPIRATORY (INHALATION) at 03:35

## 2018-07-20 RX ADMIN — BENZONATATE 100 MG: 100 CAPSULE ORAL at 20:32

## 2018-07-20 RX ADMIN — SODIUM CHLORIDE 100 ML/HR: 900 INJECTION, SOLUTION INTRAVENOUS at 20:32

## 2018-07-20 RX ADMIN — PIPERACILLIN SODIUM AND TAZOBACTAM SODIUM 3.38 G: 3; .375 INJECTION, POWDER, LYOPHILIZED, FOR SOLUTION INTRAVENOUS at 20:31

## 2018-07-20 RX ADMIN — BENZONATATE 100 MG: 100 CAPSULE ORAL at 05:29

## 2018-07-20 RX ADMIN — PIPERACILLIN SODIUM AND TAZOBACTAM SODIUM 3.38 G: 3; .375 INJECTION, POWDER, LYOPHILIZED, FOR SOLUTION INTRAVENOUS at 11:31

## 2018-07-20 RX ADMIN — FAMOTIDINE 20 MG: 20 TABLET ORAL at 07:47

## 2018-07-20 RX ADMIN — ENOXAPARIN SODIUM 40 MG: 40 INJECTION, SOLUTION INTRAVENOUS; SUBCUTANEOUS at 16:43

## 2018-07-20 RX ADMIN — Medication 5 ML: at 20:38

## 2018-07-20 RX ADMIN — LEVALBUTEROL HYDROCHLORIDE 1.25 MG: 1.25 SOLUTION RESPIRATORY (INHALATION) at 19:12

## 2018-07-20 NOTE — PROGRESS NOTES
Problem: Interdisciplinary Rounds  Goal: Interdisciplinary Rounds  Interdisciplinary team rounds were held 7/20/2018 with the following team members:Care Management, Physical Therapy, Physician and Clinical Coordinator and the patient. Plan of care discussed. See clinical pathway and/or care plan for interventions and desired outcomes.

## 2018-07-20 NOTE — PROGRESS NOTES
Pt reports cough is better at this time. Tylenol 650 mg 2 tabs po given for complaints of headache 5/10 will monitor.

## 2018-07-20 NOTE — PROGRESS NOTES
Received report from [address] [place] [address]. Patient awake in bed. RR present and chest expansion symmetrical.  AxO x4. S1 and S2 noted. Radial and pedal pulses palpable bilaterally. Bowel sounds active. Patient states last BM was today, 07/20/18. No complaints at this time. Family present at bedside. Bed low, locked, and side rails x2. Call light within reach. Will continue to monitor.

## 2018-07-20 NOTE — PROGRESS NOTES
Progress Note Patient: Tika Zeng MRN: [id number]  SSN: [id number] YOB: 1970  Age: 46 y.o. Sex: female Admit Date: 7/18/2018 LOS: 2 days Subjective:  
 
Seen at bedside. Has persistent wheezing and rhonchi but has improved. Lactic acid remains elevated. Complains of chest discomfort. Elevated WBC could be due to the use of IV steroids. Objective:  
 
Vitals:  
 07/20/18 0732 07/20/18 1138 07/20/18 1302 07/20/18 1536 BP:  137/59 117/67 141/81 Pulse:  65 (!) 55 61 Resp:  18  18 Temp:  97.8 °F (36.6 °C)  98 °F (36.7 °C) SpO2: 96% 96% 99% 95% Weight:      
Height:      
  
 
Intake and Output: 
Current Shift: 07/20 0701 - 07/20 1900 In: 4396 [I.V.:3208] Out: - Last three shifts: 07/18 1901 - 07/20 0700 In: 4876 [P.O.:1320; I.V.:8555] Out: 950 [Urine:950] Physical Exam:  
GENERAL: alert, mild distress, appears stated age EYE: conjunctivae/corneas clear. LYMPHATIC: Cervical, supraclavicular, and axillary nodes normal.  
THROAT & NECK: normal and no erythema or exudates noted. LUNG: bilateral wheezing and rhonchi, slightly better HEART: regular rate and rhythm, S1, S2 normal, no murmur, click, rub or gallop ABDOMEN: soft, non-tender. Bowel sounds normal. No masses,  no organomegaly EXTREMITIES:  extremities normal, atraumatic, no cyanosis or edema SKIN: Normal. 
NEUROLOGIC: no focal deficits. PSYCHIATRIC: non focal 
 
Lab/Data Review: 
Lab results reviewed. For significant abnormal values and values requiring intervention, see assessment and plan. Assessment:  
 
Principal Problem: 
  Acute respiratory failure with hypoxemia ([address] (7/18/2018) Active Problems: 
  Tobacco abuse (3/10/2018) Acute exacerbation of chronic obstructive pulmonary disease (COPD) ([address] (7/18/2018) Lactic acidosis (7/18/2018) Leucocytosis (7/18/2018) Acute bronchiolitis with bronchospasm (7/18/2018)   Left breast lump (7/18/2018) Atypical pneumonia (7/19/2018) Sepsis ([facility] (7/19/2018) Plan:  
 
-continue IV fluids 
-monitor lactic acid 
-continue IV zosyn for now  
-continue IV steroids 
-continue breathing therapies 
-monitor VS 
 
Signed By: Vignesh Wiggins MD   
 July 20, 2018

## 2018-07-20 NOTE — PROGRESS NOTES
Patient no ready for discharge per MD and will remain here over the weekend. Case Management will continue to follow for discharge planning.

## 2018-07-20 NOTE — PROGRESS NOTES
MD paged, notified of patient recent lactic levels @ 4.4, no new orders received, informed to monitor, continue fluids @ 100cc/hr.

## 2018-07-20 NOTE — PROGRESS NOTES
Pt awakens when spoken to. Pt complaints of midsternal chest pain. No radiation. Pt rates pain 6/10 at this time. Will monitor.

## 2018-07-20 NOTE — PROGRESS NOTES
Patient rested quietly throughout night, IV fluids infusing, cough seems to subsided, remains on 2L n/c, denies needs, RR even and unlabored, call light within reach.

## 2018-07-20 NOTE — PROGRESS NOTES
Pt resting in bed family at bedside. No distress noted at this time. Pt on RA. Pt complaints of cough. Pt encouraged to call for assistance if needed call light in reach, door open will monitor.

## 2018-07-20 NOTE — PROGRESS NOTES
Pt sitting up in bed. Pt alert oriented times 3 at this time. Pt reports feeling better this am. Pt wheezing less. Pt on 1 1/2 L NC at this time. Pt complaints of slight headache. No acute distress noted at this time. Pt encouraged to call for assistance if needed call light in reach, door open will monitor.

## 2018-07-20 NOTE — PROGRESS NOTES
Pt complaints of discomfort to chest. No acute distress noted at this time. Pt remains on RA. Pt sitting up in  Bed eating dinner. Will monitor.

## 2018-07-20 NOTE — PROGRESS NOTES
Patient is sitting up in bed, on O2 @ 2L, receiving breathing treatment, patient with audible wheezing admits to SOB on exertion, NS bolus infusing, patient with +1 edema to BLE, denies pain and discomfort @ this time, call light within reach.

## 2018-07-20 NOTE — PROGRESS NOTES
Pt sitting up in bed. Pt reports feeling alittle better. Pt denies chest pain at this time. Pt encouraged to call for assistance if needed call light in reach, door open will monitor.

## 2018-07-21 LAB
ANION GAP SERPL CALC-SCNC: 7 MMOL/L (ref 7–16)
ATRIAL RATE: 60 BPM
BUN SERPL-MCNC: 17 MG/DL (ref 6–23)
CALCIUM SERPL-MCNC: 8.1 MG/DL (ref 8.3–10.4)
CALCULATED P AXIS, ECG09: 73 DEGREES
CALCULATED R AXIS, ECG10: 41 DEGREES
CALCULATED T AXIS, ECG11: 77 DEGREES
CHLORIDE SERPL-SCNC: 107 MMOL/L (ref 98–107)
CO2 SERPL-SCNC: 26 MMOL/L (ref 21–32)
CREAT SERPL-MCNC: 0.79 MG/DL (ref 0.6–1)
DIAGNOSIS, 93000: NORMAL
ERYTHROCYTE [DISTWIDTH] IN BLOOD BY AUTOMATED COUNT: 14.6 % (ref 11.9–14.6)
GLUCOSE SERPL-MCNC: 134 MG/DL (ref 65–100)
HCT VFR BLD AUTO: 32.9 % (ref 35.8–46.3)
HGB BLD-MCNC: 10.7 G/DL (ref 11.7–15.4)
MCH RBC QN AUTO: 31 PG (ref 26.1–32.9)
MCHC RBC AUTO-ENTMCNC: 32.5 G/DL (ref 31.4–35)
MCV RBC AUTO: 95.4 FL (ref 79.6–97.8)
P-R INTERVAL, ECG05: 144 MS
PLATELET # BLD AUTO: 265 K/UL (ref 150–450)
PMV BLD AUTO: 11 FL (ref 10.8–14.1)
POTASSIUM SERPL-SCNC: 3.9 MMOL/L (ref 3.5–5.1)
Q-T INTERVAL, ECG07: 420 MS
QRS DURATION, ECG06: 94 MS
QTC CALCULATION (BEZET), ECG08: 420 MS
RBC # BLD AUTO: 3.45 M/UL (ref 4.05–5.25)
SODIUM SERPL-SCNC: 140 MMOL/L (ref 136–145)
VENTRICULAR RATE, ECG03: 60 BPM
WBC # BLD AUTO: 19.2 K/UL (ref 4.3–11.1)

## 2018-07-21 PROCEDURE — 74011250637 HC RX REV CODE- 250/637: Performed by: INTERNAL MEDICINE

## 2018-07-21 PROCEDURE — 99218 HC RM OBSERVATION: CPT

## 2018-07-21 PROCEDURE — 74011000250 HC RX REV CODE- 250: Performed by: INTERNAL MEDICINE

## 2018-07-21 PROCEDURE — 74011250636 HC RX REV CODE- 250/636: Performed by: INTERNAL MEDICINE

## 2018-07-21 PROCEDURE — 77030020263 HC SOL INJ SOD CL0.9% LFCR 1000ML

## 2018-07-21 PROCEDURE — 85027 COMPLETE CBC AUTOMATED: CPT | Performed by: INTERNAL MEDICINE

## 2018-07-21 PROCEDURE — 65270000029 HC RM PRIVATE

## 2018-07-21 PROCEDURE — 94640 AIRWAY INHALATION TREATMENT: CPT

## 2018-07-21 PROCEDURE — 94760 N-INVAS EAR/PLS OXIMETRY 1: CPT

## 2018-07-21 PROCEDURE — 80048 BASIC METABOLIC PNL TOTAL CA: CPT | Performed by: INTERNAL MEDICINE

## 2018-07-21 PROCEDURE — 74011000258 HC RX REV CODE- 258: Performed by: INTERNAL MEDICINE

## 2018-07-21 RX ORDER — PREDNISONE 20 MG/1
60 TABLET ORAL
Status: DISCONTINUED | OUTPATIENT
Start: 2018-07-22 | End: 2018-07-22 | Stop reason: HOSPADM

## 2018-07-21 RX ADMIN — Medication 10 ML: at 21:14

## 2018-07-21 RX ADMIN — BUDESONIDE 500 MCG: 0.5 INHALANT RESPIRATORY (INHALATION) at 19:25

## 2018-07-21 RX ADMIN — Medication 10 ML: at 05:09

## 2018-07-21 RX ADMIN — HYDROCODONE BITARTRATE AND HOMATROPINE METHYLBROMIDE 5 ML: 5; 1.5 SOLUTION ORAL at 03:00

## 2018-07-21 RX ADMIN — GUAIFENESIN 600 MG: 600 TABLET, EXTENDED RELEASE ORAL at 09:33

## 2018-07-21 RX ADMIN — Medication 10 ML: at 14:30

## 2018-07-21 RX ADMIN — HYDROCODONE BITARTRATE AND HOMATROPINE METHYLBROMIDE 5 ML: 5; 1.5 SOLUTION ORAL at 21:14

## 2018-07-21 RX ADMIN — HYDROCODONE BITARTRATE AND HOMATROPINE METHYLBROMIDE 5 ML: 5; 1.5 SOLUTION ORAL at 11:05

## 2018-07-21 RX ADMIN — FAMOTIDINE 20 MG: 20 TABLET ORAL at 17:27

## 2018-07-21 RX ADMIN — LEVALBUTEROL HYDROCHLORIDE 1.25 MG: 1.25 SOLUTION RESPIRATORY (INHALATION) at 15:15

## 2018-07-21 RX ADMIN — Medication 5 ML: at 14:28

## 2018-07-21 RX ADMIN — BENZONATATE 100 MG: 100 CAPSULE ORAL at 11:10

## 2018-07-21 RX ADMIN — PIPERACILLIN SODIUM AND TAZOBACTAM SODIUM 3.38 G: 3; .375 INJECTION, POWDER, LYOPHILIZED, FOR SOLUTION INTRAVENOUS at 02:56

## 2018-07-21 RX ADMIN — LEVALBUTEROL HYDROCHLORIDE 1.25 MG: 1.25 SOLUTION RESPIRATORY (INHALATION) at 11:00

## 2018-07-21 RX ADMIN — BUDESONIDE 500 MCG: 0.5 INHALANT RESPIRATORY (INHALATION) at 07:19

## 2018-07-21 RX ADMIN — BENZONATATE 100 MG: 100 CAPSULE ORAL at 21:14

## 2018-07-21 RX ADMIN — LEVALBUTEROL HYDROCHLORIDE 1.25 MG: 1.25 SOLUTION RESPIRATORY (INHALATION) at 23:11

## 2018-07-21 RX ADMIN — TRAMADOL HYDROCHLORIDE 50 MG: 50 TABLET, FILM COATED ORAL at 11:05

## 2018-07-21 RX ADMIN — PIPERACILLIN SODIUM AND TAZOBACTAM SODIUM 3.38 G: 3; .375 INJECTION, POWDER, LYOPHILIZED, FOR SOLUTION INTRAVENOUS at 11:06

## 2018-07-21 RX ADMIN — ENOXAPARIN SODIUM 40 MG: 40 INJECTION, SOLUTION INTRAVENOUS; SUBCUTANEOUS at 17:26

## 2018-07-21 RX ADMIN — LEVALBUTEROL HYDROCHLORIDE 1.25 MG: 1.25 SOLUTION RESPIRATORY (INHALATION) at 07:19

## 2018-07-21 RX ADMIN — METHYLPREDNISOLONE SODIUM SUCCINATE 40 MG: 40 INJECTION, POWDER, FOR SOLUTION INTRAMUSCULAR; INTRAVENOUS at 14:28

## 2018-07-21 RX ADMIN — GUAIFENESIN 600 MG: 600 TABLET, EXTENDED RELEASE ORAL at 21:14

## 2018-07-21 RX ADMIN — LEVALBUTEROL HYDROCHLORIDE 1.25 MG: 1.25 SOLUTION RESPIRATORY (INHALATION) at 19:25

## 2018-07-21 RX ADMIN — METHYLPREDNISOLONE SODIUM SUCCINATE 40 MG: 40 INJECTION, POWDER, FOR SOLUTION INTRAMUSCULAR; INTRAVENOUS at 05:08

## 2018-07-21 RX ADMIN — TRAMADOL HYDROCHLORIDE 50 MG: 50 TABLET, FILM COATED ORAL at 17:26

## 2018-07-21 RX ADMIN — PANTOPRAZOLE SODIUM 40 MG: 40 TABLET, DELAYED RELEASE ORAL at 05:08

## 2018-07-21 RX ADMIN — FAMOTIDINE 20 MG: 20 TABLET ORAL at 09:33

## 2018-07-21 RX ADMIN — LORATADINE 10 MG: 10 TABLET ORAL at 09:33

## 2018-07-21 RX ADMIN — BENZONATATE 100 MG: 100 CAPSULE ORAL at 15:09

## 2018-07-21 RX ADMIN — Medication 5 ML: at 21:15

## 2018-07-21 RX ADMIN — HYDROCODONE BITARTRATE AND HOMATROPINE METHYLBROMIDE 5 ML: 5; 1.5 SOLUTION ORAL at 15:09

## 2018-07-21 NOTE — PROGRESS NOTES
Ultram 50 mg po given for pain. Tessalon 100 mg po given for cough. Hycodan 5 ml po given for cough.

## 2018-07-21 NOTE — PROGRESS NOTES
Received report from [facility]. Patient awake in bed. Respirations present and chest expansion symmetrical.  AxO x4. S1 and S2 noted. Radial and pedal pulses palpable bilaterally. Edema noted in bilateral lower extremities. Bowel sounds active. Patient states last BM was today, 07/21/18. No complaints at this time.  present at bedside. Bed low, locked, and side rails x2. Call light within reach. Will continue to monitor.

## 2018-07-21 NOTE — PROGRESS NOTES
Progress Note Patient: Freddy Amaya MRN: [id number]  SSN: [id number] YOB: 1970  Age: 46 y.o. Sex: female Admit Date: 7/18/2018 LOS: 2 days Subjective:  
 
Seen at bedside. Feels better. Wheezing has improved but is still present. Will switch her to oral medications today. Hopefully home soon. Objective:  
 
Vitals:  
 07/21/18 1039 07/21/18 1100 07/21/18 1433 07/21/18 1516 BP: 146/86  126/74 Pulse: (!) 46  (!) 52 Resp: 18 19 Temp: 97.7 °F (36.5 °C)  98.2 °F (36.8 °C) SpO2: 95% 97% 96% 95% Weight:      
Height:      
  
 
Intake and Output: 
Current Shift: 07/21 0701 - 07/21 1900 In: 840 [P.O.:840] Out: - Last three shifts: 07/19 1901 - 07/21 0700 In: 8892 [I.V.:4808] Out: - Physical Exam:  
GENERAL: alert, 
EYE: conjunctivae/corneas clear. LYMPHATIC: Cervical, supraclavicular, and axillary nodes normal.  
THROAT & NECK: normal and no erythema or exudates noted. LUNG: bilateral wheezing and rhonchi, slightly better HEART: regular rate and rhythm, S1, S2 normal, no murmur, click, rub or gallop ABDOMEN: soft, non-tender. Bowel sounds normal. No masses,  no organomegaly EXTREMITIES:  extremities normal, atraumatic, no cyanosis or edema SKIN: Normal. 
NEUROLOGIC: no focal deficits. PSYCHIATRIC: non focal 
 
Lab/Data Review: 
Lab results reviewed. For significant abnormal values and values requiring intervention, see assessment and plan. Assessment:  
 
Principal Problem: 
  Acute respiratory failure with hypoxemia ([facility] (7/18/2018) Active Problems: 
  Tobacco abuse (3/10/2018) Acute exacerbation of chronic obstructive pulmonary disease (COPD) ([facility] (7/18/2018) Lactic acidosis (7/18/2018) Leucocytosis (7/18/2018) Acute bronchiolitis with bronchospasm (7/18/2018) Left breast lump (7/18/2018) Atypical pneumonia (7/19/2018) Sepsis ([facility] (7/19/2018) Plan:  
 
-stop IV fluids 
-switched to oral levaquin  
-switched to oral steroids  
-continue breathing therapies 
-monitor VS 
 
Signed By: Fabiola Lamas MD   
 July 21, 2018

## 2018-07-21 NOTE — PROGRESS NOTES
Patient awake in bed. Patient slept throughout the night. No complaints at this time. Will give report to oncoming RN.

## 2018-07-22 VITALS
HEIGHT: 67 IN | OXYGEN SATURATION: 95 % | TEMPERATURE: 98 F | RESPIRATION RATE: 18 BRPM | DIASTOLIC BLOOD PRESSURE: 78 MMHG | SYSTOLIC BLOOD PRESSURE: 124 MMHG | HEART RATE: 60 BPM | BODY MASS INDEX: 27.48 KG/M2 | WEIGHT: 175.1 LBS

## 2018-07-22 LAB
ANION GAP SERPL CALC-SCNC: 8 MMOL/L (ref 7–16)
BUN SERPL-MCNC: 15 MG/DL (ref 6–23)
CALCIUM SERPL-MCNC: 8 MG/DL (ref 8.3–10.4)
CHLORIDE SERPL-SCNC: 106 MMOL/L (ref 98–107)
CO2 SERPL-SCNC: 27 MMOL/L (ref 21–32)
CREAT SERPL-MCNC: 0.74 MG/DL (ref 0.6–1)
ERYTHROCYTE [DISTWIDTH] IN BLOOD BY AUTOMATED COUNT: 14.4 % (ref 11.9–14.6)
GLUCOSE SERPL-MCNC: 88 MG/DL (ref 65–100)
HCT VFR BLD AUTO: 31.6 % (ref 35.8–46.3)
HGB BLD-MCNC: 10.3 G/DL (ref 11.7–15.4)
MCH RBC QN AUTO: 31 PG (ref 26.1–32.9)
MCHC RBC AUTO-ENTMCNC: 32.6 G/DL (ref 31.4–35)
MCV RBC AUTO: 95.2 FL (ref 79.6–97.8)
PLATELET # BLD AUTO: 135 K/UL (ref 150–450)
PMV BLD AUTO: 11.6 FL (ref 10.8–14.1)
POTASSIUM SERPL-SCNC: 4 MMOL/L (ref 3.5–5.1)
RBC # BLD AUTO: 3.32 M/UL (ref 4.05–5.25)
SODIUM SERPL-SCNC: 141 MMOL/L (ref 136–145)
WBC # BLD AUTO: 15.8 K/UL (ref 4.3–11.1)

## 2018-07-22 PROCEDURE — 74011250637 HC RX REV CODE- 250/637: Performed by: INTERNAL MEDICINE

## 2018-07-22 PROCEDURE — 74011000250 HC RX REV CODE- 250: Performed by: INTERNAL MEDICINE

## 2018-07-22 PROCEDURE — 99218 HC RM OBSERVATION: CPT

## 2018-07-22 PROCEDURE — 85027 COMPLETE CBC AUTOMATED: CPT | Performed by: INTERNAL MEDICINE

## 2018-07-22 PROCEDURE — 74011636637 HC RX REV CODE- 636/637: Performed by: INTERNAL MEDICINE

## 2018-07-22 PROCEDURE — 94640 AIRWAY INHALATION TREATMENT: CPT

## 2018-07-22 PROCEDURE — 80048 BASIC METABOLIC PNL TOTAL CA: CPT | Performed by: INTERNAL MEDICINE

## 2018-07-22 PROCEDURE — 94760 N-INVAS EAR/PLS OXIMETRY 1: CPT

## 2018-07-22 RX ORDER — IBUPROFEN 200 MG
1 TABLET ORAL DAILY
Qty: 30 PATCH | Refills: 0 | Status: SHIPPED | OUTPATIENT
Start: 2018-07-23 | End: 2018-08-22

## 2018-07-22 RX ORDER — PANTOPRAZOLE SODIUM 40 MG/1
40 TABLET, DELAYED RELEASE ORAL
Qty: 10 TAB | Refills: 0 | Status: SHIPPED | OUTPATIENT
Start: 2018-07-23 | End: 2018-08-02

## 2018-07-22 RX ORDER — BENZONATATE 100 MG/1
100 CAPSULE ORAL
Qty: 15 CAP | Refills: 0 | Status: SHIPPED | OUTPATIENT
Start: 2018-07-22 | End: 2018-07-27

## 2018-07-22 RX ORDER — LEVOFLOXACIN 750 MG/1
750 TABLET ORAL EVERY 24 HOURS
Qty: 3 TAB | Refills: 0 | Status: SHIPPED | OUTPATIENT
Start: 2018-07-22 | End: 2018-07-25

## 2018-07-22 RX ORDER — ALBUTEROL SULFATE 90 UG/1
2 AEROSOL, METERED RESPIRATORY (INHALATION)
Qty: 1 INHALER | Refills: 1 | Status: SHIPPED | OUTPATIENT
Start: 2018-07-22

## 2018-07-22 RX ORDER — PREDNISONE 20 MG/1
TABLET ORAL
Qty: 11 TAB | Refills: 0 | Status: SHIPPED | OUTPATIENT
Start: 2018-07-22

## 2018-07-22 RX ORDER — GUAIFENESIN DEXTROMETHORPHAN HYDROBROMIDE ORAL SOLUTION 10; 100 MG/5ML; MG/5ML
10 SOLUTION ORAL
Qty: 1 BOTTLE | Refills: 0 | Status: SHIPPED | OUTPATIENT
Start: 2018-07-22

## 2018-07-22 RX ORDER — ALPRAZOLAM 0.25 MG/1
0.25 TABLET ORAL
Qty: 30 TAB | Refills: 0 | Status: SHIPPED | OUTPATIENT
Start: 2018-07-22

## 2018-07-22 RX ORDER — GUAIFENESIN 600 MG/1
600 TABLET, EXTENDED RELEASE ORAL EVERY 12 HOURS
Qty: 14 TAB | Refills: 0 | Status: SHIPPED | OUTPATIENT
Start: 2018-07-22 | End: 2018-07-29

## 2018-07-22 RX ORDER — TRAMADOL HYDROCHLORIDE 50 MG/1
50 TABLET ORAL
Qty: 20 TAB | Refills: 0 | Status: SHIPPED | OUTPATIENT
Start: 2018-07-22

## 2018-07-22 RX ADMIN — PANTOPRAZOLE SODIUM 40 MG: 40 TABLET, DELAYED RELEASE ORAL at 05:33

## 2018-07-22 RX ADMIN — TRAMADOL HYDROCHLORIDE 50 MG: 50 TABLET, FILM COATED ORAL at 08:45

## 2018-07-22 RX ADMIN — LORATADINE 10 MG: 10 TABLET ORAL at 08:44

## 2018-07-22 RX ADMIN — Medication 10 ML: at 05:34

## 2018-07-22 RX ADMIN — GUAIFENESIN 600 MG: 600 TABLET, EXTENDED RELEASE ORAL at 08:44

## 2018-07-22 RX ADMIN — LEVALBUTEROL HYDROCHLORIDE 1.25 MG: 1.25 SOLUTION RESPIRATORY (INHALATION) at 11:05

## 2018-07-22 RX ADMIN — BUDESONIDE 500 MCG: 0.5 INHALANT RESPIRATORY (INHALATION) at 07:10

## 2018-07-22 RX ADMIN — HYDROCODONE BITARTRATE AND HOMATROPINE METHYLBROMIDE 5 ML: 5; 1.5 SOLUTION ORAL at 01:50

## 2018-07-22 RX ADMIN — FAMOTIDINE 20 MG: 20 TABLET ORAL at 08:44

## 2018-07-22 RX ADMIN — TRAMADOL HYDROCHLORIDE 50 MG: 50 TABLET, FILM COATED ORAL at 00:49

## 2018-07-22 RX ADMIN — LEVALBUTEROL HYDROCHLORIDE 1.25 MG: 1.25 SOLUTION RESPIRATORY (INHALATION) at 07:10

## 2018-07-22 RX ADMIN — Medication 10 ML: at 05:33

## 2018-07-22 RX ADMIN — HYDROCODONE BITARTRATE AND HOMATROPINE METHYLBROMIDE 5 ML: 5; 1.5 SOLUTION ORAL at 10:54

## 2018-07-22 RX ADMIN — PREDNISONE 60 MG: 20 TABLET ORAL at 08:44

## 2018-07-22 NOTE — PROGRESS NOTES
Gave discharge instructions to the pt. The pt voices a clear understanding, primary nurse removed PICC line, no bleeding at the site.

## 2018-07-22 NOTE — PROGRESS NOTES
Ultram 50 mg 1 tab po given for complaints of right rib area pain 8/10 from coughing,. Will monitor.

## 2018-07-22 NOTE — PROGRESS NOTES
Pt sitting up in bed. Pt alert oriented times  At this time. Pt complaints of right rib pain 8/10 at this time. Pt reports pain from coughing. Pt on RA at this time. No acute distress noted at this time. Pt encouraged to call for assistance if needed call light in reach, door open will monitor.

## 2018-07-22 NOTE — DISCHARGE INSTRUCTIONS
DISCHARGE SUMMARY from Nurse    PATIENT INSTRUCTIONS:    After general anesthesia or intravenous sedation, for 24 hours or while taking prescription Narcotics:  · Limit your activities  · Do not drive and operate hazardous machinery  · Do not make important personal or business decisions  · Do  not drink alcoholic beverages  · If you have not urinated within 8 hours after discharge, please contact your surgeon on call. Report the following to your surgeon:  · Excessive pain, swelling, redness or odor of or around the surgical area  · Temperature over 100.5  · Nausea and vomiting lasting longer than 4 hours or if unable to take medications  · Any signs of decreased circulation or nerve impairment to extremity: change in color, persistent  numbness, tingling, coldness or increase pain  · Any questions    What to do at Home:  Recommended activity: Activity as tolerated    If you experience any of the following symptoms shortness of breath not relieved by rest, pain not relieved by medication, chest pain or pressure, temperature greater than 101, nausea, vomiting, diarrhea, or increase in weakness fatigue or swelling please follow up with MD.    *  Please give a list of your current medications to your Primary Care Provider. *  Please update this list whenever your medications are discontinued, doses are      changed, or new medications (including over-the-counter products) are added. *  Please carry medication information at all times in case of emergency situations. These are general instructions for a healthy lifestyle:    No smoking/ No tobacco products/ Avoid exposure to second hand smoke  Surgeon General's Warning:  Quitting smoking now greatly reduces serious risk to your health.     Obesity, smoking, and sedentary lifestyle greatly increases your risk for illness    A healthy diet, regular physical exercise & weight monitoring are important for maintaining a healthy lifestyle    You may be retaining fluid if you have a history of heart failure or if you experience any of the following symptoms:  Weight gain of 3 pounds or more overnight or 5 pounds in a week, increased swelling in our hands or feet or shortness of breath while lying flat in bed. Please call your doctor as soon as you notice any of these symptoms; do not wait until your next office visit. Recognize signs and symptoms of STROKE:    F-face looks uneven    A-arms unable to move or move unevenly    S-speech slurred or non-existent    T-time-call 911 as soon as signs and symptoms begin-DO NOT go       Back to bed or wait to see if you get better-TIME IS BRAIN. Warning Signs of HEART ATTACK     Call 911 if you have these symptoms:   Chest discomfort. Most heart attacks involve discomfort in the center of the chest that lasts more than a few minutes, or that goes away and comes back. It can feel like uncomfortable pressure, squeezing, fullness, or pain.  Discomfort in other areas of the upper body. Symptoms can include pain or discomfort in one or both arms, the back, neck, jaw, or stomach.  Shortness of breath with or without chest discomfort.  Other signs may include breaking out in a cold sweat, nausea, or lightheadedness. Don't wait more than five minutes to call 911 - MINUTES MATTER! Fast action can save your life. Calling 911 is almost always the fastest way to get lifesaving treatment. Emergency Medical Services staff can begin treatment when they arrive -- up to an hour sooner than if someone gets to the hospital by car. The discharge information has been reviewed with the patient and spouse. The patient and spouse verbalized understanding. Discharge medications reviewed with the patient and spouse and appropriate educational materials and side effects teaching were provided.   ___________________________________________________________________________________________________________________________________ Shortness of Breath: Care Instructions  Your Care Instructions  Shortness of breath has many causes. Sometimes conditions such as anxiety can lead to shortness of breath. Some people get mild shortness of breath when they exercise. Trouble breathing also can be a symptom of a serious problem, such as asthma, lung disease, emphysema, heart problems, and pneumonia. If your shortness of breath continues, you may need tests and treatment. Watch for any changes in your breathing and other symptoms. Follow-up care is a key part of your treatment and safety. Be sure to make and go to all appointments, and call your doctor if you are having problems. It's also a good idea to know your test results and keep a list of the medicines you take. How can you care for yourself at home? · Do not smoke or allow others to smoke around you. If you need help quitting, talk to your doctor about stop-smoking programs and medicines. These can increase your chances of quitting for good. · Get plenty of rest and sleep. · Take your medicines exactly as prescribed. Call your doctor if you think you are having a problem with your medicine. · Find healthy ways to deal with stress. ¨ Exercise daily. ¨ Get plenty of sleep. ¨ Eat regularly and well. When should you call for help? Call 911 anytime you think you may need emergency care. For example, call if:    · You have severe shortness of breath.     · You have symptoms of a heart attack. These may include:  ¨ Chest pain or pressure, or a strange feeling in the chest.  ¨ Sweating. ¨ Shortness of breath. ¨ Nausea or vomiting. ¨ Pain, pressure, or a strange feeling in the back, neck, jaw, or upper belly or in one or both shoulders or arms. ¨ Lightheadedness or sudden weakness. ¨ A fast or irregular heartbeat. After you call 911, the  may tell you to chew 1 adult-strength or 2 to 4 low-dose aspirin. Wait for an ambulance.  Do not try to drive yourself.    Call your doctor now or seek immediate medical care if:    · Your shortness of breath gets worse or you start to wheeze. Wheezing is a high-pitched sound when you breathe.     · You wake up at night out of breath or have to prop your head up on several pillows to breathe.     · You are short of breath after only light activity or while at rest.    Watch closely for changes in your health, and be sure to contact your doctor if:    · You do not get better over the next 1 to 2 days. Where can you learn more? Go to http://jayna-scooter.info/. Enter S780 in the search box to learn more about \"Shortness of Breath: Care Instructions. \"  Current as of: December 6, 2017  Content Version: 11.7  © 0465-7433 [employer]. Care instructions adapted under license by [employer] (which disclaims liability or warranty for this information). If you have questions about a medical condition or this instruction, always ask your healthcare professional. [address] any warranty or liability for your use of this information.

## 2018-07-23 LAB
BACTERIA SPEC CULT: NORMAL
BACTERIA SPEC CULT: NORMAL
L PNEUMO1 AG UR QL IA: NEGATIVE
SERVICE CMNT-IMP: NORMAL
SERVICE CMNT-IMP: NORMAL
SPECIMEN SOURCE: NORMAL

## 2018-07-24 LAB
M PNEUMO IGG SER IA-ACNC: 1097 U/ML (ref 0–99)
M PNEUMO IGG SER IA-ACNC: 877 U/ML (ref 0–99)
M PNEUMO IGM SER IA-ACNC: <770 U/ML (ref 0–769)
M PNEUMO IGM SER IA-ACNC: <770 U/ML (ref 0–769)

## 2018-07-24 NOTE — DISCHARGE SUMMARY
Discharge Summary     Patient: Lalo Menendez MRN: [id number]  SSN: [id number]    YOB: 1970  Age: 46 y.o.   Sex: female       Admit Date: 7/18/2018    Discharge Date: 7/22/2018    Admission Diagnoses: Acute exacerbation of chronic obstructive pulmonary disease (COPD) ([place] [address].)  Atypical pneumonia    Discharge Diagnoses:   Problem List as of 7/22/2018  Date Reviewed: 3/26/2018          Codes Class Noted - Resolved    Atypical pneumonia ICD-10-CM: J18.9  ICD-9-CM: 486  7/19/2018 - Present        Sepsis ([place] [address].) ICD-10-CM: A41.9  ICD-9-CM: 038.9, 995.91  7/19/2018 - Present        Acute exacerbation of chronic obstructive pulmonary disease (COPD) ([place] [address].) ICD-10-CM: J44.1  ICD-9-CM: 491.21  7/18/2018 - Present        Lactic acidosis ICD-10-CM: E87.2  ICD-9-CM: 276.2  7/18/2018 - Present        Leucocytosis ICD-10-CM: D72.829  ICD-9-CM: 288.60  7/18/2018 - Present        Acute bronchiolitis with bronchospasm ICD-10-CM: J21.9  ICD-9-CM: 466.19  7/18/2018 - Present        * (Principal)Acute respiratory failure with hypoxemia ([place] [address].) ICD-10-CM: J96.01  ICD-9-CM: 518.81  7/18/2018 - Present        Left breast lump ICD-10-CM: N63.20  ICD-9-CM: 611.72  7/18/2018 - Present        Tobacco abuse ICD-10-CM: Z72.0  ICD-9-CM: 305.1  3/10/2018 - Present        Non-cardiac chest pain ICD-10-CM: R07.89  ICD-9-CM: 786.59  3/10/2018 - Present        HTN (hypertension) (Chronic) ICD-10-CM: I10  ICD-9-CM: 401.9  3/9/2018 - Present        COPD (chronic obstructive pulmonary disease) ([facility] ICD-10-CM: J44.9  ICD-9-CM: 698  3/9/2018 - Present        Chest pain ICD-10-CM: R07.9  ICD-9-CM: 786.50  3/9/2018 - Present        RESOLVED: Coronary artery disease involving native coronary artery of native heart with unstable angina pectoris (HCC) ICD-10-CM: I25.110  ICD-9-CM: 414.01, 411.1  3/9/2018 - 3/26/2018        RESOLVED: CAD (coronary artery disease) ICD-10-CM: I25.10  ICD-9-CM: 414.00  3/9/2018 - 3/26/2018               Discharge Condition: [place] Course: This is a 52yo F with PMH od COPD (not on home O2), Tobacco abuse, seasonal allergies, Breast Lump (benign) was transferred from [facility] on 7/18/18  for COPD exacerbation and Bronchitis. Pt started having SOB and wheezing 3 days before her admission. SOB was described as gradual onset, persistent, worse with exertion, no relieving factors, associated with cough productive of brownish sputum and fever 103.8. When she started feeling bad, she went to the ER at [facility] and was sent home on doxycycline and Prednisone. Her condition worsened over the next two days and she went to [facility] where her CXR was clear and she had severe wheezing with SOB. She was given decadron 10mg, Nebs and Rocephin 1g IV and sent to [facility] ER. She she arrived here she was found to have severe bilateral wheezing and rhonchi,  LA  was 3.4, ABG showed Hypoxemia with PO2 of 69 and PCO2 was 33. Hospitalist asked to admit Pt. She was initially started on oral antibiotics, IV steroids, IV fluids and nebulizations. Her condition did not improve. She had elevated WBC count at admission, which became worse in the subsequent days ( not clear if this was secondary to the use of steroids or worsening condition), her lactic acid remained elevated and she received large amounts of IV fluids. She was switched to IV zosyn to broad her antibiotic coverage. A chest x ray done in the hospital reported bilateral diffuse infiltrates suggestive of atypical pneumonia. After 72 hours of treatment, her condition started to improve. She was able to breath better and was switched to oral steroids and oral antibiotics. She still had wheezing but had improved very much compared to the time of admission. She was considered stable for discharge on 7/22. She will remain off duty for 1 more week and she will follow up with her PCP. PE:    GENERAL: alert,  EYE: conjunctivae/corneas clear.   LYMPHATIC: Cervical, supraclavicular, and axillary nodes normal.   THROAT & NECK: normal and no erythema or exudates noted. LUNG: bilateral wheezing, minimal.   HEART: regular rate and rhythm, S1, S2 normal, no murmur, click, rub or gallop  ABDOMEN: soft, non-tender. Bowel sounds normal. No masses,  no organomegaly  EXTREMITIES:  extremities normal, atraumatic, no cyanosis or edema  SKIN: Normal.  NEUROLOGIC: no focal deficits. PSYCHIATRIC: non focal      Consults: None    Significant Diagnostic Studies: see hospital course     Disposition: home    Discharge Medications:   No current facility-administered medications for this encounter. Current Outpatient Prescriptions:     albuterol (PROAIR HFA) 90 mcg/actuation inhaler, Take 2 Puffs by inhalation every six (6) hours as needed for Wheezing., Disp: 1 Inhaler, Rfl: 1    inhalational spacing device, 1 Each by Does Not Apply route as needed. , Disp: 1 Device, Rfl: 0    benzonatate (TESSALON) 100 mg capsule, Take 1 Cap by mouth three (3) times daily as needed for Cough for up to 5 days. , Disp: 15 Cap, Rfl: 0    guaiFENesin ER (MUCINEX) 600 mg ER tablet, Take 1 Tab by mouth every twelve (12) hours for 7 days. , Disp: 14 Tab, Rfl: 0    guaiFENesin-dextromethorphan (TUSSI-ORGANIDIN DM)  mg/5 mL liqd, Take 10 mL by mouth every six (6) hours as needed. , Disp: 1 Bottle, Rfl: 0    levoFLOXacin (LEVAQUIN) 750 mg tablet, Take 1 Tab by mouth every twenty-four (24) hours for 3 days. , Disp: 3 Tab, Rfl: 0    nicotine (NICODERM CQ) 21 mg/24 hr, 1 Patch by TransDERmal route daily for 30 days. , Disp: 30 Patch, Rfl: 0    traMADol (ULTRAM) 50 mg tablet, Take 1 Tab by mouth every six (6) hours as needed. Max Daily Amount: 200 mg. Do not use this medication together with xanax. Allow a 2 hour window, Disp: 20 Tab, Rfl: 0    pantoprazole (PROTONIX) 40 mg tablet, Take 1 Tab by mouth Daily (before breakfast) for 10 days. , Disp: 10 Tab, Rfl: 0    predniSONE (DELTASONE) 20 mg tablet, Take 2 tablets every day before breakfast for 3 days, then Take 1 tablet every day BB for 3 days, then Take half a tablet every day BB for 3 days, then stop, Disp: 11 Tab, Rfl: 0    ALPRAZolam (XANAX) 0.25 mg tablet, Take 1 Tab by mouth two (2) times daily as needed for Anxiety. Max Daily Amount: 0.5 mg. Do not use this medication together with tramadol. Allow a 2 hr window, Disp: 30 Tab, Rfl: 0    fluticasone (FLONASE) 50 mcg/actuation nasal spray, 2 Sprays by Both Nostrils route daily. , Disp: 1 Bottle, Rfl: 11    loratadine (CLARITIN) 10 mg tablet, Take 1 Tab by mouth daily. , Disp: 30 Tab, Rfl: 11    fluticasone-vilanterol (BREO ELLIPTA) 200-25 mcg/dose inhaler, Take 1 Puff by inhalation daily. RINSE MOUTH WELL AFTER USE, Disp: 1 Inhaler, Rfl: 11    famotidine (PEPCID) 20 mg tablet, Take 1 Tab by mouth two (2) times a day., Disp: 60 Tab, Rfl: 1      Activity: Activity as tolerated  Diet: Regular Diet  Wound Care: None needed    Follow-up Appointments   Procedures    FOLLOW UP VISIT Appointment in: One Week PCP     PCP     Standing Status:   Standing     Number of Occurrences:   1     Order Specific Question:   Appointment in     Answer:    One Week       Signed By: Hao Frost MD     July 24, 2018

## 2018-12-12 ENCOUNTER — APPOINTMENT (OUTPATIENT)
Dept: GENERAL RADIOLOGY | Age: 48
End: 2018-12-12
Attending: EMERGENCY MEDICINE
Payer: COMMERCIAL

## 2018-12-12 ENCOUNTER — APPOINTMENT (OUTPATIENT)
Dept: CT IMAGING | Age: 48
End: 2018-12-12
Attending: STUDENT IN AN ORGANIZED HEALTH CARE EDUCATION/TRAINING PROGRAM
Payer: COMMERCIAL

## 2018-12-12 ENCOUNTER — HOSPITAL ENCOUNTER (EMERGENCY)
Age: 48
Discharge: HOME | End: 2018-12-12
Attending: STUDENT IN AN ORGANIZED HEALTH CARE EDUCATION/TRAINING PROGRAM
Payer: COMMERCIAL

## 2018-12-12 VITALS
SYSTOLIC BLOOD PRESSURE: 108 MMHG | HEIGHT: 67 IN | DIASTOLIC BLOOD PRESSURE: 72 MMHG | BODY MASS INDEX: 27.47 KG/M2 | TEMPERATURE: 98 F | HEART RATE: 64 BPM | WEIGHT: 175 LBS | OXYGEN SATURATION: 98 % | RESPIRATION RATE: 16 BRPM

## 2018-12-12 DIAGNOSIS — R07.9 CHEST PAIN, UNSPECIFIED TYPE: Primary | ICD-10-CM

## 2018-12-12 DIAGNOSIS — R55 SYNCOPE AND COLLAPSE: ICD-10-CM

## 2018-12-12 LAB
ALBUMIN SERPL-MCNC: 4.1 G/DL (ref 3.5–5)
ALBUMIN/GLOB SERPL: 1.1 {RATIO} (ref 1.2–3.5)
ALP SERPL-CCNC: 79 U/L (ref 50–136)
ALT SERPL-CCNC: 31 U/L (ref 12–65)
AMPHET UR QL SCN: NEGATIVE
ANION GAP SERPL CALC-SCNC: 10 MMOL/L (ref 7–16)
AST SERPL-CCNC: 34 U/L (ref 15–37)
ATRIAL RATE: 64 BPM
BARBITURATES UR QL SCN: NEGATIVE
BASOPHILS # BLD: 0.1 K/UL (ref 0–0.2)
BASOPHILS NFR BLD: 1 % (ref 0–2)
BENZODIAZ UR QL: NEGATIVE
BILIRUB SERPL-MCNC: 0.5 MG/DL (ref 0.2–1.1)
BUN SERPL-MCNC: 18 MG/DL (ref 6–23)
CALCIUM SERPL-MCNC: 9.5 MG/DL (ref 8.3–10.4)
CALCULATED P AXIS, ECG09: 63 DEGREES
CALCULATED R AXIS, ECG10: 34 DEGREES
CALCULATED T AXIS, ECG11: 63 DEGREES
CANNABINOIDS UR QL SCN: NEGATIVE
CHLORIDE SERPL-SCNC: 106 MMOL/L (ref 98–107)
CO2 SERPL-SCNC: 24 MMOL/L (ref 21–32)
COCAINE UR QL SCN: NEGATIVE
CREAT SERPL-MCNC: 0.76 MG/DL (ref 0.6–1)
D DIMER PPP FEU-MCNC: 0.36 UG/ML(FEU)
DIAGNOSIS, 93000: NORMAL
DIFFERENTIAL METHOD BLD: NORMAL
EOSINOPHIL # BLD: 0.3 K/UL (ref 0–0.8)
EOSINOPHIL NFR BLD: 3 % (ref 0.5–7.8)
ERYTHROCYTE [DISTWIDTH] IN BLOOD BY AUTOMATED COUNT: 13.8 % (ref 11.9–14.6)
GLOBULIN SER CALC-MCNC: 3.7 G/DL (ref 2.3–3.5)
GLUCOSE SERPL-MCNC: 68 MG/DL (ref 65–100)
HCT VFR BLD AUTO: 42.2 % (ref 35.8–46.3)
HGB BLD-MCNC: 13.9 G/DL (ref 11.7–15.4)
IMM GRANULOCYTES # BLD: 0.1 K/UL (ref 0–0.5)
IMM GRANULOCYTES NFR BLD AUTO: 1 % (ref 0–5)
LACTATE BLD-SCNC: 1.27 MMOL/L (ref 0.5–1.9)
LYMPHOCYTES # BLD: 3.3 K/UL (ref 0.5–4.6)
LYMPHOCYTES NFR BLD: 32 % (ref 13–44)
MCH RBC QN AUTO: 31.7 PG (ref 26.1–32.9)
MCHC RBC AUTO-ENTMCNC: 32.9 G/DL (ref 31.4–35)
MCV RBC AUTO: 96.1 FL (ref 79.6–97.8)
METHADONE UR QL: NEGATIVE
MONOCYTES # BLD: 0.9 K/UL (ref 0.1–1.3)
MONOCYTES NFR BLD: 9 % (ref 4–12)
NEUTS SEG # BLD: 5.5 K/UL (ref 1.7–8.2)
NEUTS SEG NFR BLD: 54 % (ref 43–78)
NRBC # BLD: 0 K/UL (ref 0–0.2)
OPIATES UR QL: NEGATIVE
P-R INTERVAL, ECG05: 182 MS
PCP UR QL: NEGATIVE
PLATELET # BLD AUTO: 289 K/UL (ref 150–450)
PMV BLD AUTO: 11.2 FL (ref 9.4–12.3)
POTASSIUM SERPL-SCNC: 4.8 MMOL/L (ref 3.5–5.1)
PROT SERPL-MCNC: 7.8 G/DL (ref 6.3–8.2)
Q-T INTERVAL, ECG07: 446 MS
QRS DURATION, ECG06: 92 MS
QTC CALCULATION (BEZET), ECG08: 460 MS
RBC # BLD AUTO: 4.39 M/UL (ref 4.05–5.2)
SODIUM SERPL-SCNC: 140 MMOL/L (ref 136–145)
TROPONIN I BLD-MCNC: 0 NG/ML (ref 0.02–0.05)
TROPONIN I BLD-MCNC: 0 NG/ML (ref 0.02–0.05)
VENTRICULAR RATE, ECG03: 64 BPM
WBC # BLD AUTO: 10.2 K/UL (ref 4.3–11.1)

## 2018-12-12 PROCEDURE — 80053 COMPREHEN METABOLIC PANEL: CPT

## 2018-12-12 PROCEDURE — 81003 URINALYSIS AUTO W/O SCOPE: CPT | Performed by: STUDENT IN AN ORGANIZED HEALTH CARE EDUCATION/TRAINING PROGRAM

## 2018-12-12 PROCEDURE — 93005 ELECTROCARDIOGRAM TRACING: CPT | Performed by: STUDENT IN AN ORGANIZED HEALTH CARE EDUCATION/TRAINING PROGRAM

## 2018-12-12 PROCEDURE — 85379 FIBRIN DEGRADATION QUANT: CPT

## 2018-12-12 PROCEDURE — 74011250636 HC RX REV CODE- 250/636: Performed by: STUDENT IN AN ORGANIZED HEALTH CARE EDUCATION/TRAINING PROGRAM

## 2018-12-12 PROCEDURE — 83605 ASSAY OF LACTIC ACID: CPT

## 2018-12-12 PROCEDURE — 71045 X-RAY EXAM CHEST 1 VIEW: CPT

## 2018-12-12 PROCEDURE — 99285 EMERGENCY DEPT VISIT HI MDM: CPT | Performed by: STUDENT IN AN ORGANIZED HEALTH CARE EDUCATION/TRAINING PROGRAM

## 2018-12-12 PROCEDURE — 85025 COMPLETE CBC W/AUTO DIFF WBC: CPT

## 2018-12-12 PROCEDURE — 84484 ASSAY OF TROPONIN QUANT: CPT

## 2018-12-12 PROCEDURE — 70450 CT HEAD/BRAIN W/O DYE: CPT

## 2018-12-12 PROCEDURE — 80307 DRUG TEST PRSMV CHEM ANLYZR: CPT

## 2018-12-12 PROCEDURE — 74011250637 HC RX REV CODE- 250/637: Performed by: STUDENT IN AN ORGANIZED HEALTH CARE EDUCATION/TRAINING PROGRAM

## 2018-12-12 PROCEDURE — 93005 ELECTROCARDIOGRAM TRACING: CPT | Performed by: EMERGENCY MEDICINE

## 2018-12-12 PROCEDURE — 96374 THER/PROPH/DIAG INJ IV PUSH: CPT | Performed by: STUDENT IN AN ORGANIZED HEALTH CARE EDUCATION/TRAINING PROGRAM

## 2018-12-12 RX ORDER — KETOROLAC TROMETHAMINE 30 MG/ML
30 INJECTION, SOLUTION INTRAMUSCULAR; INTRAVENOUS
Status: COMPLETED | OUTPATIENT
Start: 2018-12-12 | End: 2018-12-12

## 2018-12-12 RX ORDER — NITROGLYCERIN 0.4 MG/1
0.4 TABLET SUBLINGUAL
Status: DISCONTINUED | OUTPATIENT
Start: 2018-12-12 | End: 2018-12-12

## 2018-12-12 RX ORDER — GUAIFENESIN 100 MG/5ML
324 LIQUID (ML) ORAL
Status: COMPLETED | OUTPATIENT
Start: 2018-12-12 | End: 2018-12-12

## 2018-12-12 RX ADMIN — SODIUM CHLORIDE 1000 ML: 900 INJECTION, SOLUTION INTRAVENOUS at 13:55

## 2018-12-12 RX ADMIN — NITROGLYCERIN 0.4 MG: 0.4 TABLET, ORALLY DISINTEGRATING SUBLINGUAL at 15:35

## 2018-12-12 RX ADMIN — KETOROLAC TROMETHAMINE 30 MG: 30 INJECTION, SOLUTION INTRAMUSCULAR at 16:47

## 2018-12-12 RX ADMIN — ASPIRIN 81 MG 324 MG: 81 TABLET ORAL at 13:30

## 2018-12-12 NOTE — ED TRIAGE NOTES
Pt arrives from work via EMS, started to not feel well, near syncope, alert on arrival by first responders. intial Bp 132/104, HR 47m, given 1 nitro, 324 ASA, pressure to 99/73 and HR 59. Pt given 0.5mgatropine. Last heart rate 68, pressure 109/77. 98%. Pt states pressure in chest and chest pain. Pt is noncompliant with all of her home meds.  Pt is A and o on arrival. Pt still complaining of chest pain

## 2018-12-12 NOTE — DISCHARGE INSTRUCTIONS
Drink plenty of clear liquids to ensure hydration. Arrange follow-up care with the specialist listed. Return immediately for worsening conditions, concerns or questions. Chest Pain: Care Instructions  Your Care Instructions    There are many things that can cause chest pain. Some are not serious and will get better on their own in a few days. But some kinds of chest pain need more testing and treatment. Your doctor may have recommended a follow-up visit in the next 8 to 12 hours. If you are not getting better, you may need more tests or treatment. Even though your doctor has released you, you still need to watch for any problems. The doctor carefully checked you, but sometimes problems can develop later. If you have new symptoms or if your symptoms do not get better, get medical care right away. If you have worse or different chest pain or pressure that lasts more than 5 minutes or you passed out (lost consciousness), call 911 or seek other emergency help right away. A medical visit is only one step in your treatment. Even if you feel better, you still need to do what your doctor recommends, such as going to all suggested follow-up appointments and taking medicines exactly as directed. This will help you recover and help prevent future problems. How can you care for yourself at home? · Rest until you feel better. · Take your medicine exactly as prescribed. Call your doctor if you think you are having a problem with your medicine. · Do not drive after taking a prescription pain medicine. When should you call for help? Call 911 if:    · You passed out (lost consciousness).     · You have severe difficulty breathing.     · You have symptoms of a heart attack. These may include:  ? Chest pain or pressure, or a strange feeling in your chest.  ? Sweating. ? Shortness of breath. ? Nausea or vomiting.   ? Pain, pressure, or a strange feeling in your back, neck, jaw, or upper belly or in one or both shoulders or arms. ? Lightheadedness or sudden weakness. ? A fast or irregular heartbeat. After you call 911, the  may tell you to chew 1 adult-strength or 2 to 4 low-dose aspirin. Wait for an ambulance. Do not try to drive yourself.    Call your doctor today if:    · You have any trouble breathing.     · Your chest pain gets worse.     · You are dizzy or lightheaded, or you feel like you may faint.     · You are not getting better as expected.     · You are having new or different chest pain. Where can you learn more? Go to http://jayna-scooter.info/. Enter A120 in the search box to learn more about \"Chest Pain: Care Instructions. \"  Current as of: November 20, 2017  Content Version: 11.8  © 8314-7823 [employer]. Care instructions adapted under license by [employer] (which disclaims liability or warranty for this information). If you have questions about a medical condition or this instruction, always ask your healthcare professional. [place] [address] any warranty or liability for your use of this information. Lightheadedness or Faintness: Care Instructions  Your Care Instructions  Lightheadedness is a feeling that you are about to faint or \"pass out. \" You do not feel as if you or your surroundings are moving. It is different from vertigo, which is the feeling that you or things around you are spinning or tilting. Lightheadedness usually goes away or gets better when you lie down. If lightheadedness gets worse, it can lead to a fainting spell. It is common to feel lightheaded from time to time. Lightheadedness usually is not caused by a serious problem. It often is caused by a short-lasting drop in blood pressure and blood flow to your head that occurs when you get up too quickly from a seated or lying position. Follow-up care is a key part of your treatment and safety.  Be sure to make and go to all appointments, and call your doctor if you are having problems. It's also a good idea to know your test results and keep a list of the medicines you take. How can you care for yourself at home? · Lie down for 1 or 2 minutes when you feel lightheaded. After lying down, sit up slowly and remain sitting for 1 to 2 minutes before slowly standing up. · Avoid movements, positions, or activities that have made you lightheaded in the past.  · Get plenty of rest, especially if you have a cold or flu, which can cause lightheadedness. · Make sure you drink plenty of fluids, especially if you have a fever or have been sweating. · Do not drive or put yourself and others in danger while you feel lightheaded. When should you call for help? Call 911 anytime you think you may need emergency care. For example, call if:    · You have symptoms of a stroke. These may include:  ? Sudden numbness, tingling, weakness, or loss of movement in your face, arm, or leg, especially on only one side of your body. ? Sudden vision changes. ? Sudden trouble speaking. ? Sudden confusion or trouble understanding simple statements. ? Sudden problems with walking or balance. ? A sudden, severe headache that is different from past headaches.     · You have symptoms of a heart attack. These may include:  ? Chest pain or pressure, or a strange feeling in the chest.  ? Sweating. ? Shortness of breath. ? Nausea or vomiting. ? Pain, pressure, or a strange feeling in the back, neck, jaw, or upper belly or in one or both shoulders or arms. ? Lightheadedness or sudden weakness. ? A fast or irregular heartbeat. After you call 911, the  may tell you to chew 1 adult-strength or 2 to 4 low-dose aspirin. Wait for an ambulance. Do not try to drive yourself.    Watch closely for changes in your health, and be sure to contact your doctor if:    · Your lightheadedness gets worse or does not get better with home care. Where can you learn more?   Go to http://jayna-scooter.info/. Enter B142 in the search box to learn more about \"Lightheadedness or Faintness: Care Instructions. \"  Current as of: November 20, 2017  Content Version: 11.8  © 6016-6459 [employer]. Care instructions adapted under license by [employer] (which disclaims liability or warranty for this information). If you have questions about a medical condition or this instruction, always ask your healthcare professional. [address] any warranty or liability for your use of this information.

## 2018-12-12 NOTE — ED NOTES
I have reviewed discharge instructions with the patient. The patient verbalized understanding. Patient left ED via Discharge Method: ambulatory to Home with self. Opportunity for questions and clarification provided. Patient given 0 scripts. To continue your aftercare when you leave the hospital, you may receive an automated call from our care team to check in on how you are doing. This is a free service and part of our promise to provide the best care and service to meet your aftercare needs.  If you have questions, or wish to unsubscribe from this service please call [phone]. Thank you for Choosing our [facility] Emergency Department.

## 2018-12-12 NOTE — ED PROVIDER NOTES
47-year-old female patient presents with reports of substernal chest pain, shortness of breath and syncopal episode. Patient states she felt well this morning but reports onset of substernal chest discomfort at approximately 9 AM while at work. She states his pain worsen and cause some shortness of breath. She began to feel lightheaded and reports that she lost consciousness. This also consciousness was witnessed by a bystander. States patient lost consciousness and was helped to the floor where she \"was foaming at the mouth and vomited\". Patient denies previous syncopal episodes. She states she has had a seizure in the past but this was related to a bee sting. She does not have any underlying seizure disorder which she is aware. She denies tongue biting or less of bowel or bladder control. At this time, patient reports continued substernal chest pain and mild shortness of breath. Of note, patient was found to have a sinus bradycardia by EMS providers with a rate of 47 beats a minute. She received atropine in route to correct this. She reports a history of bradycardia in the past consistent with this finding. The history is provided by the patient, a relative, the EMS personnel and a friend. No  was used. Chest Pain (Angina)    This is a new problem. The current episode started 3 to 5 hours ago. The problem has not changed since onset. The problem occurs constantly. The pain is associated with breathing and normal activity. The pain is present in the substernal region. The pain is moderate. The quality of the pain is described as pressure-like. Associated symptoms include dizziness, shortness of breath and weakness.  Pertinent negatives include no abdominal pain, no back pain, no claudication, no cough, no diaphoresis, no exertional chest pressure, no fever, no headaches, no hemoptysis, no irregular heartbeat, no leg pain, no lower extremity edema, no malaise/fatigue, no nausea, no numbness, no orthopnea, no palpitations, no PND, no sputum production and no vomiting. Risk factors include smoking/tobacco exposure. Past Medical History:   Diagnosis Date    [facility]     COPD [facility]chronic obstructive pulmonary disease) [facility]     Tobacco dependence due to cigarettes        Past Surgical History:   Procedure Laterality Date    HX APPENDECTOMY      HX BREAST LUMPECTOMY  2016    HX  SECTION      HX CHOLECYSTECTOMY           Family History:   Problem Relation Age of Onset    Diabetes Mother     Heart Disease Father        Social History     Socioeconomic History    Marital status: SINGLE     Spouse name: Not on file    Number of children: Not on file    Years of education: Not on file    Highest education level: Not on file   Social Needs    Financial resource strain: Not on file    Food insecurity - worry: Not on file    Food insecurity - inability: Not on file   Money Forward needs - medical: Not on file   [employer] needs - non-medical: Not on file   Occupational History    Not on file   Tobacco Use    Smoking status: Current Every Day Smoker     Packs/day: 1.00     Years: 33.00     Pack years: 33.00     Types: Cigarettes    Smokeless tobacco: Never Used   Substance and Sexual Activity    Alcohol use: Not on file    Drug use: Not on file    Sexual activity: Not on file   Other Topics Concern    Not on file   Social History Narrative    Pt's daughter lives with her. 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 She has 2 dogs and 1 fish at home. She has a boyfriend. ALLERGIES: Iodinated contrast- oral and iv dye and Keflex [cephalexin]    Review of Systems   Constitutional: Negative for chills, diaphoresis, fever and malaise/fatigue. HENT: Negative for congestion, sneezing and sore throat. Eyes: Negative for visual disturbance.    Respiratory: Positive for shortness of breath. Negative for cough, hemoptysis, sputum production, chest tightness and wheezing. Cardiovascular: Positive for chest pain. Negative for palpitations, orthopnea, claudication, leg swelling and PND. Gastrointestinal: Negative for abdominal pain, blood in stool, diarrhea, nausea and vomiting. Endocrine: Negative for polyuria. Genitourinary: Negative for difficulty urinating, dysuria, flank pain, hematuria and urgency. Musculoskeletal: Negative for back pain, myalgias, neck pain and neck stiffness. Skin: Negative for color change and rash. Neurological: Positive for dizziness and weakness. Negative for syncope, speech difficulty, light-headedness, numbness and headaches. Psychiatric/Behavioral: Negative for behavioral problems. All other systems reviewed and are negative. Vitals:    12/12/18 1306   BP: 114/66   Pulse: 66   Resp: 18   Temp: 97.8 °F (36.6 °C)   SpO2: 97%   Weight: 79.4 kg (175 lb)   Height: 5' 7\" (1.702 m)            Physical Exam   Constitutional: She is oriented to person, place, and time. She appears well-developed and well-nourished. No distress. Alert and oriented to person place and time. No acute distress, speaks in clear, fluid sentences. HENT:   Head: Normocephalic and atraumatic. Right Ear: External ear normal.   Left Ear: External ear normal.   Nose: Nose normal.   Eyes: EOM are normal. Pupils are equal, round, and reactive to light. Neck: Normal range of motion. Cardiovascular: Regular rhythm, normal heart sounds and intact distal pulses. Bradycardia present. Exam reveals no gallop and no friction rub. No murmur heard. Pulmonary/Chest: Effort normal and breath sounds normal. No respiratory distress. She has no wheezes. She has no rales. She exhibits no tenderness. Abdominal: Soft. She exhibits no distension and no mass. There is no tenderness. There is no rebound and no guarding. No hernia. Musculoskeletal: Normal range of motion. She exhibits no edema, tenderness or deformity. Neurological: She is alert and oriented to person, place, and time. No cranial nerve deficit. Skin: Skin is warm and dry. She is not diaphoretic. Nursing note and vitals reviewed. MDM  Number of Diagnoses or Management Options  Chest pain, unspecified type: new and requires workup  Syncope and collapse: new and requires workup  Diagnosis management comments: EKG obtained onarrival shows a sinus rhythm with a rate of 64 beats a minute. No evidence of acute ischemic change. Initial troponin within normal limits, d-dimer normal, laboratory evaluation unremarkable otherwise. Chest x-ray clear    ==============================================================================  MOST RECENT CARDIAC CATH:  Type ID Dictating Provider  Transcribed Cardiac Cath Report [id number] Charu Esquivel MD  Signed by Charu Esquivel MD on 18 at [address] [place] [address] copied text    Hover for details       E Aileen ELLIS     Aung Trammell  MR#: [id number]  : 1970  ACCOUNT #: [de-identified]   DATE OF SERVICE: 03/10/2018     PROCEDURE PERFORMED:  Left heart cath, selective coronary angiography, left ventriculogram.     INDICATION:  New onset class 4 angina.     COMPLICATIONS:  None.     ACCESS:  Right radial.       TIME:  8:30-8:53      MEDICATIONS:  2 mg Versed, 50 mcg of fentanyl delivered by Barry Ivan RN.       Aortic pressure 113/71, LVEDP 13.       CONTRAST:  70 mL      FINDINGS:  Left ventriculogram done in the PAYAN projection shows EF 65%. No gradient on pullback. No wall motion abnormalities.     Left main arises normally, bifurcates in LAD and circumflex. Left main is large and normal.     LAD courses the apex, supplies 3 diagonals. The LAD and diagonals are large and normal.     The circumflex artery in the AV groove is nondominant, supplies 2 OMs.   The circumflex and OMs are normal.     Right coronary artery is a large, dominant artery coursing in the AV groove supplying a posterior descending and posterior lateral, right system is normal.     CONCLUSIONS:  Normal coronary arteriography with normal left ventricular systolic function. The patient has noncardiac chest pain.        Bentley Desai MD  ===========================================================    Patient exhibited exhibited no abnormal rhythm aside from slight sinus bradycardia since arrival.  She is recently undergone cardiac Normal results. Troponin ×2 is negative. D-dimer within normal limits. Remainder of laboratory testing is unremarkable. Patient reports reproducible chest discomfort at the center of her chest however per bystander description, brief episode of CPR was performed on patient. I suspect this explains her continued pain. She does have a history of seizure secondary to bee stings per reports. Her symptoms today may be explained by seizure though no seizure-like activity was witnessed. She has no findings on exam consistent with seizure  While I think she is stable for discharge, I did explain the importance of close outpatient follow-up with both primary care and cardiology. She voices understanding and agreement. Tolerating oral intake at this time.                  Amount and/or Complexity of Data Reviewed  Clinical lab tests: ordered and reviewed  Tests in the radiology section of CPT®: ordered and reviewed  Tests in the medicine section of CPT®: ordered and reviewed  Review and summarize past medical records: yes  Independent visualization of images, tracings, or specimens: yes    Risk of Complications, Morbidity, and/or Mortality  Presenting problems: moderate  Diagnostic procedures: low  Management options: moderate    Patient Progress  Patient progress: stable         Procedures

## 2019-07-08 ENCOUNTER — HOSPITAL ENCOUNTER (EMERGENCY)
Age: 49
Discharge: LEFT BEFORE DISCHARGE | End: 2019-07-08
Attending: EMERGENCY MEDICINE
Payer: COMMERCIAL

## 2019-07-08 VITALS
OXYGEN SATURATION: 98 % | SYSTOLIC BLOOD PRESSURE: 92 MMHG | HEIGHT: 66 IN | BODY MASS INDEX: 25.71 KG/M2 | RESPIRATION RATE: 18 BRPM | DIASTOLIC BLOOD PRESSURE: 55 MMHG | TEMPERATURE: 97.5 F | WEIGHT: 160 LBS | HEART RATE: 61 BPM

## 2019-07-08 LAB
ALBUMIN SERPL-MCNC: 3.6 G/DL (ref 3.5–5)
ALBUMIN/GLOB SERPL: 1.1 {RATIO} (ref 1.2–3.5)
ALP SERPL-CCNC: 76 U/L (ref 50–136)
ALT SERPL-CCNC: 26 U/L (ref 12–65)
ANION GAP SERPL CALC-SCNC: 6 MMOL/L (ref 7–16)
AST SERPL-CCNC: 20 U/L (ref 15–37)
ATRIAL RATE: 54 BPM
BILIRUB SERPL-MCNC: 0.5 MG/DL (ref 0.2–1.1)
BUN SERPL-MCNC: 13 MG/DL (ref 6–23)
CALCIUM SERPL-MCNC: 8.5 MG/DL (ref 8.3–10.4)
CALCULATED P AXIS, ECG09: 72 DEGREES
CALCULATED R AXIS, ECG10: 52 DEGREES
CALCULATED T AXIS, ECG11: 66 DEGREES
CHLORIDE SERPL-SCNC: 111 MMOL/L (ref 98–107)
CO2 SERPL-SCNC: 24 MMOL/L (ref 21–32)
CREAT SERPL-MCNC: 0.84 MG/DL (ref 0.6–1)
D DIMER PPP FEU-MCNC: 1.37 UG/ML(FEU)
DIAGNOSIS, 93000: NORMAL
ERYTHROCYTE [DISTWIDTH] IN BLOOD BY AUTOMATED COUNT: 14.5 % (ref 11.9–14.6)
GLOBULIN SER CALC-MCNC: 3.2 G/DL (ref 2.3–3.5)
GLUCOSE SERPL-MCNC: 93 MG/DL (ref 65–100)
HCT VFR BLD AUTO: 41.7 % (ref 35.8–46.3)
HGB BLD-MCNC: 13.9 G/DL (ref 11.7–15.4)
MCH RBC QN AUTO: 31.7 PG (ref 26.1–32.9)
MCHC RBC AUTO-ENTMCNC: 33.3 G/DL (ref 31.4–35)
MCV RBC AUTO: 95 FL (ref 79.6–97.8)
NRBC # BLD: 0 K/UL (ref 0–0.2)
P-R INTERVAL, ECG05: 188 MS
PLATELET # BLD AUTO: 271 K/UL (ref 150–450)
PMV BLD AUTO: 11 FL (ref 9.4–12.3)
POTASSIUM SERPL-SCNC: 3.9 MMOL/L (ref 3.5–5.1)
PROT SERPL-MCNC: 6.8 G/DL (ref 6.3–8.2)
Q-T INTERVAL, ECG07: 468 MS
QRS DURATION, ECG06: 88 MS
QTC CALCULATION (BEZET), ECG08: 443 MS
RBC # BLD AUTO: 4.39 M/UL (ref 4.05–5.2)
SODIUM SERPL-SCNC: 141 MMOL/L (ref 136–145)
TROPONIN I SERPL-MCNC: <0.02 NG/ML (ref 0.02–0.05)
VENTRICULAR RATE, ECG03: 54 BPM
WBC # BLD AUTO: 13.1 K/UL (ref 4.3–11.1)

## 2019-07-08 PROCEDURE — 85379 FIBRIN DEGRADATION QUANT: CPT

## 2019-07-08 PROCEDURE — 99283 EMERGENCY DEPT VISIT LOW MDM: CPT | Performed by: EMERGENCY MEDICINE

## 2019-07-08 PROCEDURE — 80053 COMPREHEN METABOLIC PANEL: CPT

## 2019-07-08 PROCEDURE — 85027 COMPLETE CBC AUTOMATED: CPT

## 2019-07-08 PROCEDURE — 93005 ELECTROCARDIOGRAM TRACING: CPT | Performed by: EMERGENCY MEDICINE

## 2019-07-08 PROCEDURE — 84484 ASSAY OF TROPONIN QUANT: CPT

## 2019-07-08 NOTE — ED TRIAGE NOTES
Patient arrived via EMS from . Patient states CP. Began 0100 this morning. Located left chest. Radiating to left arm and up neck. Rates pain 8/10. Denies HTN.

## 2019-07-08 NOTE — ED PROVIDER NOTES
[facility] Emergency Department  Arrival Date/Time: No admission date for patient encounter. Salomón Spaulding  MRN: [id number]    YOB: 1970   44 y.o. female    [facility] EMERGENCY DEPT Room/bed info not found  Seen on 2019 @ 1:08 PM      TRIAGE Provider NOTE:    Orquidea Stern MD; 2019 @1:08 PM============================     Salomón Spaulding is a 44 y.o. female seen on 2019 at 1:08 PM in the [facility] EMERGENCY DEPT   CP 1 AM, left sided. Down left arm and up left neck. 8/10. No prior cardiac hx. No htn. Denies drug use. No cough, fever or congestion. + smoker. + tubal. No travel. From Emergency MD - Trop neg. Received Aspirin x 4. Nitro SL x 3 that DID NOT help with CP. Chest XR from OP - read as normal.  Cocaine POS. Patient denies. Lungs clear. RRR. Equal pulses. Abdomen soft. No guarding/rebound. Will check labs, EKG. Trop, CXR. EKG - sinus mague 54. Normal axis and interval. No STEMi noted. No ischemic changes.    Will be seen by Main ED  ======================================    HPI: HPI    Review of Systems: Review of Systems     PAST MEDICAL HISTORY:  Primary Care Doctor: María, Not On File None  Past Medical History:   Diagnosis Date    [facility] [address] [facility]     COPD (chronic obstructive pulmonary disease) [facility] [address] [facility]     Tobacco dependence due to cigarettes      Past Surgical History:   Procedure Laterality Date    HX APPENDECTOMY      HX BREAST LUMPECTOMY  2016    HX  SECTION      HX CHOLECYSTECTOMY       Social History     Socioeconomic History    Marital status: SINGLE     Spouse name: Not on file    Number of children: Not on file    Years of education: Not on file    Highest education level: Not on file   Tobacco Use    Smoking status: Current Every Day Smoker     Packs/day: 1.00     Years: 33.00     Pack years: 33.00     Types: Cigarettes    Smokeless tobacco: Never Used   Social History Narrative    Pt's daughter lives with her. 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 She has 2 dogs and 1 fish at home. She has a boyfriend. Cannot display prior to admission medications because the patient has not been admitted in this contact. Allergies   Allergen Reactions    Iodinated Contrast- Oral And Iv Dye Hives    Keflex [Cephalexin] Hives         Physical Exam:  Nursing documentation reviewed. There were no vitals filed for this visit. Vital signs were reviewed. Physical Exam    Medical Decision Making  MDM      Procedures     ED Evaluation:  LABS: No results found for this or any previous visit (from the past 24 hour(s)).      RADIOLOGY:   No orders to display     _____________________________________________________________________

## 2022-03-18 PROBLEM — A41.9 SEPSIS (HCC): Status: ACTIVE | Noted: 2018-07-19

## 2022-03-19 PROBLEM — I10 HTN (HYPERTENSION): Status: ACTIVE | Noted: 2018-03-09

## 2022-03-19 PROBLEM — R07.9 CHEST PAIN: Status: ACTIVE | Noted: 2018-03-09

## 2022-03-19 PROBLEM — Z72.0 TOBACCO ABUSE: Status: ACTIVE | Noted: 2018-03-10

## 2022-03-19 PROBLEM — J44.9 COPD (CHRONIC OBSTRUCTIVE PULMONARY DISEASE) (HCC): Status: ACTIVE | Noted: 2018-03-09

## 2022-03-19 PROBLEM — R07.89 NON-CARDIAC CHEST PAIN: Status: ACTIVE | Noted: 2018-03-10

## 2022-03-19 PROBLEM — J44.1 ACUTE EXACERBATION OF CHRONIC OBSTRUCTIVE PULMONARY DISEASE (COPD) (HCC): Status: ACTIVE | Noted: 2018-07-18

## 2022-03-19 PROBLEM — J21.9 ACUTE BRONCHIOLITIS WITH BRONCHOSPASM: Status: ACTIVE | Noted: 2018-07-18

## 2022-03-19 PROBLEM — E87.20 LACTIC ACIDOSIS: Status: ACTIVE | Noted: 2018-07-18

## 2022-03-20 PROBLEM — N63.20 LEFT BREAST LUMP: Status: ACTIVE | Noted: 2018-07-18

## 2022-03-20 PROBLEM — J96.01 ACUTE RESPIRATORY FAILURE WITH HYPOXEMIA (HCC): Status: ACTIVE | Noted: 2018-07-18

## 2022-03-20 PROBLEM — J18.9 ATYPICAL PNEUMONIA: Status: ACTIVE | Noted: 2018-07-19

## 2022-03-20 PROBLEM — D72.829 LEUCOCYTOSIS: Status: ACTIVE | Noted: 2018-07-18

## 2025-07-22 ENCOUNTER — APPOINTMENT (OUTPATIENT)
Dept: GENERAL RADIOLOGY | Age: 55
End: 2025-07-22
Payer: COMMERCIAL

## 2025-07-22 ENCOUNTER — HOSPITAL ENCOUNTER (EMERGENCY)
Age: 55
Discharge: HOME | End: 2025-07-22
Payer: COMMERCIAL

## 2025-07-22 VITALS
WEIGHT: 183.8 LBS | OXYGEN SATURATION: 99 % | TEMPERATURE: 98.2 F | HEART RATE: 69 BPM | SYSTOLIC BLOOD PRESSURE: 118 MMHG | HEIGHT: 66 IN | RESPIRATION RATE: 17 BRPM | DIASTOLIC BLOOD PRESSURE: 70 MMHG | BODY MASS INDEX: 29.54 KG/M2

## 2025-07-22 DIAGNOSIS — S92.325A NONDISPLACED FRACTURE OF SECOND METATARSAL BONE, LEFT FOOT, INITIAL ENCOUNTER FOR CLOSED FRACTURE: Primary | ICD-10-CM

## 2025-07-22 PROCEDURE — 6360000002 HC RX W HCPCS: Performed by: STUDENT IN AN ORGANIZED HEALTH CARE EDUCATION/TRAINING PROGRAM

## 2025-07-22 PROCEDURE — 73630 X-RAY EXAM OF FOOT: CPT

## 2025-07-22 PROCEDURE — 96372 THER/PROPH/DIAG INJ SC/IM: CPT

## 2025-07-22 PROCEDURE — 99284 EMERGENCY DEPT VISIT MOD MDM: CPT

## 2025-07-22 RX ORDER — MELOXICAM 15 MG/1
15 TABLET ORAL DAILY
Qty: 30 TABLET | Refills: 0 | Status: SHIPPED | OUTPATIENT
Start: 2025-07-22 | End: 2025-08-21

## 2025-07-22 RX ORDER — KETOROLAC TROMETHAMINE 30 MG/ML
30 INJECTION, SOLUTION INTRAMUSCULAR; INTRAVENOUS ONCE
Status: COMPLETED | OUTPATIENT
Start: 2025-07-22 | End: 2025-07-22

## 2025-07-22 RX ADMIN — KETOROLAC TROMETHAMINE 30 MG: 30 INJECTION, SOLUTION INTRAMUSCULAR at 15:14

## 2025-07-22 ASSESSMENT — ENCOUNTER SYMPTOMS
SHORTNESS OF BREATH: 0
TROUBLE SWALLOWING: 0
VOMITING: 0
PHOTOPHOBIA: 0
COUGH: 0
ABDOMINAL PAIN: 0
FACIAL SWELLING: 0

## 2025-07-22 ASSESSMENT — PAIN DESCRIPTION - DESCRIPTORS
DESCRIPTORS: SORE;DISCOMFORT
DESCRIPTORS: SHARP;SHOOTING
DESCRIPTORS: DISCOMFORT;SHARP

## 2025-07-22 ASSESSMENT — PAIN DESCRIPTION - LOCATION
LOCATION: FOOT

## 2025-07-22 ASSESSMENT — PAIN DESCRIPTION - ORIENTATION
ORIENTATION: LEFT

## 2025-07-22 ASSESSMENT — PAIN SCALES - GENERAL
PAINLEVEL_OUTOF10: 9
PAINLEVEL_OUTOF10: 9
PAINLEVEL_OUTOF10: 6

## 2025-07-22 ASSESSMENT — PAIN - FUNCTIONAL ASSESSMENT
PAIN_FUNCTIONAL_ASSESSMENT: 0-10
PAIN_FUNCTIONAL_ASSESSMENT: 0-10

## 2025-07-22 ASSESSMENT — LIFESTYLE VARIABLES
HOW MANY STANDARD DRINKS CONTAINING ALCOHOL DO YOU HAVE ON A TYPICAL DAY: PATIENT DOES NOT DRINK
HOW OFTEN DO YOU HAVE A DRINK CONTAINING ALCOHOL: NEVER

## 2025-07-22 NOTE — ED TRIAGE NOTES
Pt ambulatory to room 10 with steady gait. Pt c/o L foot pain and swelling after dropping something on her foot two different times. Pt with edema noted to top of L foot. Pt in NAD during triage.

## 2025-07-22 NOTE — ED PROVIDER NOTES
Emergency Department Provider Note       [facility] EMERGENCY DEPT   PCP: No primary care provider on file.   Age: 55 y.o.   Sex: female     DISPOSITION Decision To Discharge 07/22/2025 04:03:11 PM    ICD-10-CM    1. Nondisplaced fracture of second metatarsal bone, left foot, initial encounter for closed fracture  S92.325A [facility]          Medical Decision Making     In summary this is a 55-year-old female patient who presented for evaluation with left foot pain and was found to have a fracture of the shaft of the second metatarsal on the left side based on my interpretation of x-ray.  She has point tenderness in this location.  No evidence of neurovascular disruption or compartment syndrome or overlying infection.  Will place her in a boot and crutches and refer her to orthopedics.  Pain medicine sent to pharmacy.  Counseled on signs to return for.  Patient discharged in stable condition.  ED Course as of 07/22/25 1717   Tue Jul 22, 2025   1502 Independent interpretation of x-ray shows a second metacarpal shaft fracture nondisplaced [NR]   1602 Radiology read x-ray is negative.  I have messaged Dr. Wells to ask for clarification as I feel the second metatarsal is fractured [NR]   1606 Radiology advised that they agree with my interpretation.  They will make an addendum [NR]      ED Course User Index  [NR] Jose Ha PA     1 acute, uncomplicated illness or injury.  Prescription drug management performed.  Patient was discharged risks and benefits of hospitalization were considered.  Shared medical decision making was utilized in creating the patients health plan today.  I independently ordered and reviewed each unique test.  ED attending physician present in department at time of care. Based on current hospital policy, their co-signature is not required on this note.          I interpreted the X-rays 2nd metatarsal fx.              History     55-year-old female patient with

## 2025-07-22 NOTE — ED NOTES
I have reviewed discharge instructions with the patient.  The patient verbalized understanding.    Patient left ED via Discharge Method: crutches to Home with self.    Opportunity for questions and clarification provided.       Patient given 1 scripts.         To continue your aftercare when you leave the hospital, you may receive an automated call from our care team to check in on how you are doing.  This is a free service and part of our promise to provide the best care and service to meet your aftercare needs.” If you have questions, or wish to unsubscribe from this service please call [phone].  Thank you for Choosing our [facility] Emergency Department.

## 2025-07-22 NOTE — DISCHARGE INSTRUCTIONS
Your x-ray showed a fracture.  Keep the boot on until you see orthopedics.  Pain medicine has been sent to your pharmacy.  Follow-up with orthopedics.  Call them at the number above to schedule your appointment.  Return here for new or worsening symptoms.    As we discussed, I did not find a life threatening cause of your symptoms today. However, THAT DOES NOT MEAN IT COULD NOT DEVELOP. If you develop ANY new or worsening symptoms, it is critical that you return for re-evaluation. This includes any symptoms that are concerning to you, especially symptoms such as inability to bear weight, severe pain, redness, fevers.  If you do not return for re-evaluation, you risk serious complications, including death.    It was my pleasure to take care of you today in the emergency department. Thank you for coming in today. I hope that we were able to help you out and make you more comfortable. I hope you have a speedy recovery! If you do get a survey in the mail asking how your care was, it really helps me and our department out if you respond. Thank you!

## 2025-07-23 ENCOUNTER — TELEPHONE (OUTPATIENT)
Dept: ORTHOPEDIC SURGERY | Age: 55
End: 2025-07-23

## 2025-07-23 NOTE — TELEPHONE ENCOUNTER
Urgent ED referral    Nondisplaced fracture of second metatarsal bone, left foot,    Please review and advise  Thank you

## 2025-07-28 ENCOUNTER — OFFICE VISIT (OUTPATIENT)
Dept: ORTHOPEDIC SURGERY | Age: 55
End: 2025-07-28
Payer: COMMERCIAL

## 2025-07-28 DIAGNOSIS — S92.325A CLOSED NONDISPLACED FRACTURE OF SECOND METATARSAL BONE OF LEFT FOOT, INITIAL ENCOUNTER: Primary | ICD-10-CM

## 2025-07-28 PROCEDURE — 99204 OFFICE O/P NEW MOD 45 MIN: CPT | Performed by: ORTHOPAEDIC SURGERY

## 2025-07-28 NOTE — PROGRESS NOTES
Name: Yesenia Nunez  YOB: 1970  Gender: female  MRN: [id number]    Summary:     Left foot crush injury with second metatarsal fracture     CC: New Patient (ER 07/22/2025 Left foot xrays in PACS )       HPI: Yesenia Nunez is a 55 y.o. female who presents with New Patient (ER 07/22/2025 Left foot xrays in PACS )  .  This patient presents to the office today with a history of an injury she sustained about 2 weeks ago to her left foot when a large object fell onto her foot.  She was seen in the emergency room and diagnosed with metatarsal fracture and placed in a boot.  She is currently not wearing the boot because she says it causes discomfort.    History was obtained by Patient     ROS/Meds/PSH/PMH/FH/SH: I personally reviewed the patients standard intake form.  Below are the pertinents    Tobacco:  reports that she has been smoking. She has never used smokeless tobacco.  Diabetes: None      Physical Examination:  Exam of the left lower extremity shows tender to palpation at the second metatarsal area.  There is no evidence of Lisfranc instability.  She has palpable pulses and intact sensation.  No sign or symptom of DVT is noted.      Imaging:   I independently interpreted XR ordered by a different physician, taken from an outside facility of the left foot which shows a nondisplaced second metatarsal fracture.  Her tarsometatarsal joints appear to be intact.           ALYSON ALVAREZ III, MD           Assessment:   Left foot crush injury with second metatarsal fracture    Treatment Plan:   4 This is a chronic illness/condition with exacerbation and progression  Treatment at this time: Time with no intervention  Studies ordered: Foot XR needed @ Next Visit    Weight-bearing status: WBAT        Return to work/work restrictions: none  No medications given    She will try carbon fiber plate to see if this can alleviate some of her pain since she cannot really tolerate a boot.  She will